# Patient Record
Sex: FEMALE | Race: WHITE | Employment: FULL TIME | URBAN - METROPOLITAN AREA
[De-identification: names, ages, dates, MRNs, and addresses within clinical notes are randomized per-mention and may not be internally consistent; named-entity substitution may affect disease eponyms.]

---

## 2018-05-31 ENCOUNTER — APPOINTMENT (OUTPATIENT)
Dept: GENERAL RADIOLOGY | Facility: CLINIC | Age: 48
End: 2018-05-31
Attending: EMERGENCY MEDICINE
Payer: COMMERCIAL

## 2018-05-31 ENCOUNTER — HOSPITAL ENCOUNTER (EMERGENCY)
Facility: CLINIC | Age: 48
Discharge: HOME | End: 2018-06-01
Attending: EMERGENCY MEDICINE | Admitting: EMERGENCY MEDICINE
Payer: COMMERCIAL

## 2018-05-31 DIAGNOSIS — N92.1 PROLONGED MENSTRUATION: ICD-10-CM

## 2018-05-31 DIAGNOSIS — E87.6 HYPOKALEMIA: ICD-10-CM

## 2018-05-31 DIAGNOSIS — R42 INTERMITTENT LIGHTHEADEDNESS: ICD-10-CM

## 2018-05-31 DIAGNOSIS — D50.9 MICROCYTIC ANEMIA: ICD-10-CM

## 2018-05-31 LAB
ALBUMIN SERPL-MCNC: 3.5 G/DL (ref 3.4–5)
ALBUMIN UR-MCNC: NEGATIVE MG/DL
ALP SERPL-CCNC: 102 U/L (ref 40–150)
ALT SERPL W P-5'-P-CCNC: 15 U/L (ref 0–50)
ANION GAP SERPL CALCULATED.3IONS-SCNC: 8 MMOL/L (ref 3–14)
APPEARANCE UR: CLEAR
AST SERPL W P-5'-P-CCNC: 11 U/L (ref 0–45)
BACTERIA #/AREA URNS HPF: ABNORMAL /HPF
BASOPHILS # BLD AUTO: 0 10E9/L (ref 0–0.2)
BASOPHILS NFR BLD AUTO: 0.4 %
BILIRUB DIRECT SERPL-MCNC: <0.1 MG/DL (ref 0–0.2)
BILIRUB SERPL-MCNC: 0.2 MG/DL (ref 0.2–1.3)
BILIRUB UR QL STRIP: NEGATIVE
BUN SERPL-MCNC: 7 MG/DL (ref 7–30)
CALCIUM SERPL-MCNC: 8.3 MG/DL (ref 8.5–10.1)
CHLORIDE SERPL-SCNC: 107 MMOL/L (ref 94–109)
CO2 SERPL-SCNC: 24 MMOL/L (ref 20–32)
COLOR UR AUTO: ABNORMAL
CREAT SERPL-MCNC: 0.76 MG/DL (ref 0.52–1.04)
D DIMER PPP FEU-MCNC: <0.3 UG/ML FEU (ref 0–0.5)
DIFFERENTIAL METHOD BLD: ABNORMAL
EOSINOPHIL # BLD AUTO: 0.1 10E9/L (ref 0–0.7)
EOSINOPHIL NFR BLD AUTO: 1.5 %
ERYTHROCYTE [DISTWIDTH] IN BLOOD BY AUTOMATED COUNT: 18.3 % (ref 10–15)
GFR SERPL CREATININE-BSD FRML MDRD: 80 ML/MIN/1.7M2
GLUCOSE SERPL-MCNC: 141 MG/DL (ref 70–99)
GLUCOSE UR STRIP-MCNC: NEGATIVE MG/DL
HCG SERPL QL: NEGATIVE
HCT VFR BLD AUTO: 29.2 % (ref 35–47)
HGB BLD-MCNC: 8.5 G/DL (ref 11.7–15.7)
HGB UR QL STRIP: NEGATIVE
IMM GRANULOCYTES # BLD: 0 10E9/L (ref 0–0.4)
IMM GRANULOCYTES NFR BLD: 0.1 %
INTERPRETATION ECG - MUSE: NORMAL
KETONES UR STRIP-MCNC: NEGATIVE MG/DL
LACTATE BLD-SCNC: 1.8 MMOL/L (ref 0.7–2)
LEUKOCYTE ESTERASE UR QL STRIP: NEGATIVE
LIPASE SERPL-CCNC: 180 U/L (ref 73–393)
LYMPHOCYTES # BLD AUTO: 2 10E9/L (ref 0.8–5.3)
LYMPHOCYTES NFR BLD AUTO: 25.7 %
MCH RBC QN AUTO: 20 PG (ref 26.5–33)
MCHC RBC AUTO-ENTMCNC: 29.1 G/DL (ref 31.5–36.5)
MCV RBC AUTO: 69 FL (ref 78–100)
MONOCYTES # BLD AUTO: 0.4 10E9/L (ref 0–1.3)
MONOCYTES NFR BLD AUTO: 5.4 %
NEUTROPHILS # BLD AUTO: 5.3 10E9/L (ref 1.6–8.3)
NEUTROPHILS NFR BLD AUTO: 66.9 %
NITRATE UR QL: NEGATIVE
NRBC # BLD AUTO: 0 10*3/UL
NRBC BLD AUTO-RTO: 0 /100
NT-PROBNP SERPL-MCNC: 171 PG/ML (ref 0–450)
PH UR STRIP: 6.5 PH (ref 5–7)
PLATELET # BLD AUTO: 413 10E9/L (ref 150–450)
POTASSIUM SERPL-SCNC: 3.3 MMOL/L (ref 3.4–5.3)
PROT SERPL-MCNC: 7 G/DL (ref 6.8–8.8)
RBC # BLD AUTO: 4.24 10E12/L (ref 3.8–5.2)
RBC #/AREA URNS AUTO: <1 /HPF (ref 0–2)
SODIUM SERPL-SCNC: 139 MMOL/L (ref 133–144)
SOURCE: ABNORMAL
SP GR UR STRIP: 1 (ref 1–1.03)
TROPONIN I SERPL-MCNC: <0.015 UG/L (ref 0–0.04)
TSH SERPL DL<=0.005 MIU/L-ACNC: 4.17 MU/L (ref 0.4–4)
UROBILINOGEN UR STRIP-MCNC: NORMAL MG/DL (ref 0–2)
WBC # BLD AUTO: 7.9 10E9/L (ref 4–11)
WBC #/AREA URNS AUTO: <1 /HPF (ref 0–5)

## 2018-05-31 PROCEDURE — 80076 HEPATIC FUNCTION PANEL: CPT | Performed by: EMERGENCY MEDICINE

## 2018-05-31 PROCEDURE — 85025 COMPLETE CBC W/AUTO DIFF WBC: CPT | Performed by: EMERGENCY MEDICINE

## 2018-05-31 PROCEDURE — 71046 X-RAY EXAM CHEST 2 VIEWS: CPT

## 2018-05-31 PROCEDURE — 83690 ASSAY OF LIPASE: CPT | Performed by: EMERGENCY MEDICINE

## 2018-05-31 PROCEDURE — 25000128 H RX IP 250 OP 636: Performed by: EMERGENCY MEDICINE

## 2018-05-31 PROCEDURE — 96361 HYDRATE IV INFUSION ADD-ON: CPT

## 2018-05-31 PROCEDURE — 85379 FIBRIN DEGRADATION QUANT: CPT | Performed by: EMERGENCY MEDICINE

## 2018-05-31 PROCEDURE — 84443 ASSAY THYROID STIM HORMONE: CPT | Performed by: EMERGENCY MEDICINE

## 2018-05-31 PROCEDURE — 83605 ASSAY OF LACTIC ACID: CPT | Performed by: EMERGENCY MEDICINE

## 2018-05-31 PROCEDURE — 84703 CHORIONIC GONADOTROPIN ASSAY: CPT | Performed by: EMERGENCY MEDICINE

## 2018-05-31 PROCEDURE — 25000132 ZZH RX MED GY IP 250 OP 250 PS 637: Performed by: EMERGENCY MEDICINE

## 2018-05-31 PROCEDURE — 84484 ASSAY OF TROPONIN QUANT: CPT | Performed by: EMERGENCY MEDICINE

## 2018-05-31 PROCEDURE — 81001 URINALYSIS AUTO W/SCOPE: CPT | Performed by: EMERGENCY MEDICINE

## 2018-05-31 PROCEDURE — 96360 HYDRATION IV INFUSION INIT: CPT

## 2018-05-31 PROCEDURE — 99285 EMERGENCY DEPT VISIT HI MDM: CPT | Mod: 25

## 2018-05-31 PROCEDURE — 83880 ASSAY OF NATRIURETIC PEPTIDE: CPT | Performed by: EMERGENCY MEDICINE

## 2018-05-31 PROCEDURE — 93005 ELECTROCARDIOGRAM TRACING: CPT

## 2018-05-31 PROCEDURE — 80048 BASIC METABOLIC PNL TOTAL CA: CPT | Performed by: EMERGENCY MEDICINE

## 2018-05-31 RX ORDER — ACETAMINOPHEN 325 MG/1
650 TABLET ORAL ONCE
Status: COMPLETED | OUTPATIENT
Start: 2018-05-31 | End: 2018-05-31

## 2018-05-31 RX ORDER — POTASSIUM CHLORIDE 1500 MG/1
20 TABLET, EXTENDED RELEASE ORAL ONCE
Status: COMPLETED | OUTPATIENT
Start: 2018-05-31 | End: 2018-05-31

## 2018-05-31 RX ADMIN — POTASSIUM CHLORIDE 20 MEQ: 1500 TABLET, EXTENDED RELEASE ORAL at 23:39

## 2018-05-31 RX ADMIN — ACETAMINOPHEN 650 MG: 325 TABLET ORAL at 22:32

## 2018-05-31 RX ADMIN — SODIUM CHLORIDE 1000 ML: 9 INJECTION, SOLUTION INTRAVENOUS at 23:40

## 2018-05-31 RX ADMIN — SODIUM CHLORIDE 1000 ML: 9 INJECTION, SOLUTION INTRAVENOUS at 22:32

## 2018-05-31 ASSESSMENT — ENCOUNTER SYMPTOMS
SHORTNESS OF BREATH: 1
DIARRHEA: 0
LIGHT-HEADEDNESS: 1
FREQUENCY: 0
FATIGUE: 1
NUMBNESS: 1
FEVER: 1
HEADACHES: 1
COUGH: 1
WEAKNESS: 1
DYSURIA: 0
ABDOMINAL PAIN: 0
APPETITE CHANGE: 1
NECK STIFFNESS: 0

## 2018-05-31 NOTE — ED AVS SNAPSHOT
Emergency Department    [address] [place] [address]    Phone:  [phone]    Fax:  [phone]                                       Ericka Lantigua   MRN: [id number]    Department:   Emergency Department   Date of Visit:  5/31/2018           After Visit Summary Signature Page     I have received my discharge instructions, and my questions have been answered. I have discussed any challenges I see with this plan with the nurse or doctor.    ..........................................................................................................................................  Patient/Patient Representative Signature      ..........................................................................................................................................  Patient Representative Print Name and Relationship to Patient    ..................................................               ................................................  Date                                            Time    ..........................................................................................................................................  Reviewed by Signature/Title    ...................................................              ..............................................  Date                                                            Time

## 2018-05-31 NOTE — ED AVS SNAPSHOT
Emergency Department    [address] [facility] [address]    Phone:  [phone]    Fax:  [phone]                                       Ericka Lantigua   MRN: [id number]    Department:   Emergency Department   Date of Visit:  5/31/2018           Patient Information     Date Of Birth          1970        Your diagnoses for this visit were:     Microcytic anemia     Intermittent lightheadedness     Prolonged menstruation     Hypokalemia        You were seen by Nhung Moseley MD.      Follow-up Information     Follow up with Herminio Higgins MD In 2 days.    Specialty:  Family Practice    Why:  Primary care    Contact information:    [employer]   [address]  [facility] [place] [facility] [address] [phone]          Follow up with [facility] In 2 days.    Why:  OB/Gyn    Contact information:    [facility], [address]  [address]  [facility] [address]          Follow up with  Emergency Department.    Specialty:  EMERGENCY MEDICINE    Why:  If symptoms worsen    Contact information:    [address] [facility] [address] [phone]        Discharge Instructions       Establish care with a gynecologist and primary care provider to discuss your symptoms.  You may benefit from a pelvic ultrasound and iron studies among other outpatient test depending on your symptoms.    In the meantime, start taking iron and increase dietary iron and potassium intake.    Return to the emergency department with new or concerning symptoms.    Discharge References/Attachments     ANEMIA, IRON-DEFICIENCY (ADULT) (ENGLISH)    DIZZINESS, UNCERTAIN CAUSE (ENGLISH)    HYPOKALEMIA (ENGLISH)    DYSFUNCTIONAL UTERINE BLEEDING (ENGLISH)      24 Hour Appointment Hotline       To make an appointment at any [facility], call [phone] ([phone]). If you don't have a family doctor or clinic, we will help you find one. [facility] are conveniently located  to serve the needs of you and your family.             Review of your medicines      START taking        Dose / Directions Last dose taken    ferrous sulfate 325 (65 Fe) MG tablet   Commonly known as:  IRON   Dose:  325 mg   Quantity:  30 tablet        Take 1 tablet (325 mg) by mouth daily (with breakfast)   Refills:  0                Prescriptions were sent or printed at these locations (1 Prescription)                   Other Prescriptions                Printed at Department/Unit printer (1 of 1)         ferrous sulfate (IRON) 325 (65 Fe) MG tablet                Procedures and tests performed during your visit     Basic metabolic panel    CBC with platelets differential    Cardiac Continuous Monitoring    D dimer quantitative    EKG 12-lead, tracing only    HCG qualitative Blood    Hepatic panel    Lactic acid whole blood    Lipase    Nt probnp inpatient (BNP)    Orthostatic blood pressure and pulse    Peripheral IV catheter    Pulse oximetry nursing    TSH    Troponin I    UA with Microscopic    XR Chest 2 Views      Orders Needing Specimen Collection     None      Pending Results     No orders found for last 3 day(s).            Pending Culture Results     No orders found for last 3 day(s).            Pending Results Instructions     If you had any lab results that were not finalized at the time of your Discharge, you can call the ED Lab Result RN at [phone]. You will be contacted by this team for any positive Lab results or changes in treatment. The nurses are available 7 days a week from 10A to 6:30P.  You can leave a message 24 hours per day and they will return your call.        Test Results From Your Hospital Stay        5/31/2018 10:33 PM      Narrative     CHEST TWO VIEWS  5/31/2018 10:26 PM     HISTORY: Dyspnea, intermittent, tachycardia.    COMPARISON: None.        Impression     IMPRESSION: Normal.    JOSE PALMER MD         5/31/2018 10:46 PM      Component Results     Component Value Ref  Range & Units Status    Color Urine Straw  Final    Appearance Urine Clear  Final    Glucose Urine Negative NEG^Negative mg/dL Final    Bilirubin Urine Negative NEG^Negative Final    Ketones Urine Negative NEG^Negative mg/dL Final    Specific Gravity Urine 1.002 (L) 1.003 - 1.035 Final    Blood Urine Negative NEG^Negative Final    pH Urine 6.5 5.0 - 7.0 pH Final    Protein Albumin Urine Negative NEG^Negative mg/dL Final    Urobilinogen mg/dL Normal 0.0 - 2.0 mg/dL Final    Nitrite Urine Negative NEG^Negative Final    Leukocyte Esterase Urine Negative NEG^Negative Final    Source Midstream Urine  Final    WBC Urine <1 0 - 5 /HPF Final    RBC Urine <1 0 - 2 /HPF Final    Bacteria Urine Few (A) NEG^Negative /HPF Final         5/31/2018 10:45 PM      Component Results     Component Value Ref Range & Units Status    HCG Qualitative Serum Negative NEG^Negative Final    This test is for screening purposes.  Results should be interpreted along with   the clinical picture.  Confirmation testing is available if warranted by   ordering [id number], HCG Quantitative Pregnancy.           5/31/2018 10:51 PM      Component Results     Component Value Ref Range & Units Status    N-Terminal Pro BNP Inpatient 171 0 - 450 pg/mL Final       Reference range shown and results flagged as abnormal are suggested inpatient   cut points for confirming diagnosis if CHF in an acute setting. Establishing a   baseline value for each individual patient is useful for follow-up. An   inpatient or emergency department NT-proPBNP <300 pg/mL effectively rules out   acute CHF, with 99% negative predictive value.  The outpatient non-acute reference range for ruling out CHF is:   0-125 pg/mL (age 18 to less than 75)   0-450 pg/mL (age 75 yrs and older)           5/31/2018 10:51 PM      Component Results     Component Value Ref Range & Units Status    TSH 4.17 (H) 0.40 - 4.00 mU/L Final         5/31/2018 10:45 PM      Component Results     Component Value Ref  Range & Units Status    Troponin I ES <0.015 0.000 - 0.045 ug/L Final    The 99th percentile for upper reference range is 0.045 ug/L.  Troponin values   in the range of 0.045 - 0.120 ug/L may be associated with risks of adverse   clinical events.           5/31/2018 10:48 PM      Component Results     Component Value Ref Range & Units Status    Lactic Acid 1.8 0.7 - 2.0 mmol/L Final         5/31/2018 10:40 PM      Component Results     Component Value Ref Range & Units Status    Sodium 139 133 - 144 mmol/L Final    Potassium 3.3 (L) 3.4 - 5.3 mmol/L Final    Chloride 107 94 - 109 mmol/L Final    Carbon Dioxide 24 20 - 32 mmol/L Final    Anion Gap 8 3 - 14 mmol/L Final    Glucose 141 (H) 70 - 99 mg/dL Final    Urea Nitrogen 7 7 - 30 mg/dL Final    Creatinine 0.76 0.52 - 1.04 mg/dL Final    GFR Estimate 80 >60 mL/min/1.7m2 Final    Non  GFR Calc    GFR Estimate If Black >90 >60 mL/min/1.7m2 Final    African American GFR Calc    Calcium 8.3 (L) 8.5 - 10.1 mg/dL Final         5/31/2018 10:40 PM      Component Results     Component Value Ref Range & Units Status    Lipase 180 73 - 393 U/L Final         5/31/2018 10:45 PM      Component Results     Component Value Ref Range & Units Status    Bilirubin Direct <0.1 0.0 - 0.2 mg/dL Final    Bilirubin Total 0.2 0.2 - 1.3 mg/dL Final    Albumin 3.5 3.4 - 5.0 g/dL Final    Protein Total 7.0 6.8 - 8.8 g/dL Final    Alkaline Phosphatase 102 40 - 150 U/L Final    ALT 15 0 - 50 U/L Final    AST 11 0 - 45 U/L Final         5/31/2018 10:25 PM      Component Results     Component Value Ref Range & Units Status    WBC 7.9 4.0 - 11.0 10e9/L Final    RBC Count 4.24 3.8 - 5.2 10e12/L Final    Hemoglobin 8.5 (L) 11.7 - 15.7 g/dL Final    Hematocrit 29.2 (L) 35.0 - 47.0 % Final    MCV 69 (L) 78 - 100 fl Final    MCH 20.0 (L) 26.5 - 33.0 pg Final    MCHC 29.1 (L) 31.5 - 36.5 g/dL Final    RDW 18.3 (H) 10.0 - 15.0 % Final    Platelet Count 413 150 - 450 10e9/L Final    Diff  Method Automated Method  Final    % Neutrophils 66.9 % Final    % Lymphocytes 25.7 % Final    % Monocytes 5.4 % Final    % Eosinophils 1.5 % Final    % Basophils 0.4 % Final    % Immature Granulocytes 0.1 % Final    Nucleated RBCs 0 0 /100 Final    Absolute Neutrophil 5.3 1.6 - 8.3 10e9/L Final    Absolute Lymphocytes 2.0 0.8 - 5.3 10e9/L Final    Absolute Monocytes 0.4 0.0 - 1.3 10e9/L Final    Absolute Eosinophils 0.1 0.0 - 0.7 10e9/L Final    Absolute Basophils 0.0 0.0 - 0.2 10e9/L Final    Abs Immature Granulocytes 0.0 0 - 0.4 10e9/L Final    Absolute Nucleated RBC 0.0  Final         5/31/2018 11:21 PM      Component Results     Component Value Ref Range & Units Status    D Dimer <0.3 0.0 - 0.50 ug/ml FEU Final    This D-dimer assay is intended for use in conjunction with a clinical pretest   probability assessment model to exclude pulmonary embolism (PE) and deep   venous thrombosis (DVT) in outpatients suspected of PE or DVT. The cut-off   value is 0.5 ug/mL FEU.                  Clinical Quality Measure: Blood Pressure Screening     Your blood pressure was checked while you were in the emergency department today. The last reading we obtained was  BP: (!) 147/94 . Please read the guidelines below about what these numbers mean and what you should do about them.  If your systolic blood pressure (the top number) is less than 120 and your diastolic blood pressure (the bottom number) is less than 80, then your blood pressure is normal. There is nothing more that you need to do about it.  If your systolic blood pressure (the top number) is 120-139 or your diastolic blood pressure (the bottom number) is 80-89, your blood pressure may be higher than it should be. You should have your blood pressure rechecked within a year by a primary care provider.  If your systolic blood pressure (the top number) is 140 or greater or your diastolic blood pressure (the bottom number) is 90 or greater, you may have high blood  "pressure. High blood pressure is treatable, but if left untreated over time it can put you at risk for heart attack, stroke, or kidney failure. You should have your blood pressure rechecked by a primary care provider within the next 4 weeks.  If your provider in the emergency department today gave you specific instructions to follow-up with your doctor or provider even sooner than that, you should follow that instruction and not wait for up to 4 weeks for your follow-up visit.        Thank you for choosing [facility]       Thank you for choosing [facility] for your care. Our goal is always to provide you with excellent care. Hearing back from our patients is one way we can continue to improve our services. Please take a few minutes to complete the written survey that you may receive in the mail after you visit with us. Thank you!        [employer] Information     [email or web address] lets you send messages to your doctor, view your test results, renew your prescriptions, schedule appointments and more. To sign up, go to [email or web address] . Click on \"Log in\" on the left side of the screen, which will take you to the Welcome page. Then click on \"Sign up Now\" on the right side of the page.     You will be asked to enter the access code listed below, as well as some personal information. Please follow the directions to create your username and password.     Your access code is: [email or web address]  Expires: 2018  1:07 AM     Your access code will  in 90 days. If you need help or a new code, please call your [facility] clinic or [phone].        Care EveryWhere ID     This is your Care EveryWhere ID. This could be used by other organizations to access your [facility] medical records  [id number]        Equal Access to Services     JERRI RODRIGUEZ : cassie Kelley, eve bonilla. So [facility] [phone].    ATENCIÓN: Si habla español, tiene a thurman " disposición servicios gratuitos de asistencia lingüística. Parvin al [phone].    We comply with applicable federal civil rights laws and [place] laws. We do not discriminate on the basis of race, color, national origin, age, disability, sex, sexual orientation, or gender identity.            After Visit Summary       This is your record. Keep this with you and show to your community pharmacist(s) and doctor(s) at your next visit.

## 2018-06-01 VITALS
TEMPERATURE: 98.2 F | SYSTOLIC BLOOD PRESSURE: 135 MMHG | DIASTOLIC BLOOD PRESSURE: 88 MMHG | RESPIRATION RATE: 16 BRPM | OXYGEN SATURATION: 98 %

## 2018-06-01 RX ORDER — FERROUS SULFATE 325(65) MG
325 TABLET ORAL
Qty: 30 TABLET | Refills: 0 | Status: SHIPPED | OUTPATIENT
Start: 2018-06-01 | End: 2019-02-28

## 2018-06-01 ASSESSMENT — ENCOUNTER SYMPTOMS
ABDOMINAL DISTENTION: 1
CONSTIPATION: 0
UNEXPECTED WEIGHT CHANGE: 1

## 2018-06-01 NOTE — ED PROVIDER NOTES
"  History     Chief Complaint:  Fatigue and Cough    The history is provided by the patient.      Ericka Lantigua is a 47 year old female smoker with no significant PMH who presents to the emergency department today for evaluation of fatigue and cough. The patient reports that for about two weeks she has been feeling \"off,\" very tired, weak, and lightheaded. She describes the lightheadedness as feeling like her vision goes in and out of focus and as if she could pass out. For the past week she also reports a cough, both productive at times and dry, and sinus congestion. Yesterday, the patient states she noticed her lightheadedness more than usual and a tingling sensation in her four fingers of her left hand which she describes as feeling like someone shoving needles under her nails. She notes that this has since subsided but her fingers are sore and her left arm still feels tired and weak. The patient states that due to these symptoms she became concerned and based on her WebMD search is concerned for possible CHF. She reports that of the symptoms listed, she has swollen ankles for the past two weeks, shortness of breath that has been slowly building and happens both at rest and while doing household activities, fatigue, decreased appetite, abdominal bloating, feeling warm and feverish, and headache. Here in the ED, she states that she does not currently feel short of breath and did not feel short of breath while walking in, she feels as if she has gained weight recently but does not weigh herself at home. She describes a mild headache to her left forehead. She does indicate that she has not had a bowel movement for a few days but that is not unusual for her. She denies dysuria, urinary urgency or frequency, diarrhea, abdominal pain, nausea, vomiting, neck pain or stiffness, vision changes, or chest pain. Of note, the patient reports on Saturday she finished almost a month straight of menstruating with some days " going through a tampon and pad every 2-3 hours and other lighter days.    Allergies:  No Known Drug Allergies    Medications:    Medications reviewed. No current medications.     Past Medical History:    Anxiety    Past Surgical History:    Surgical history reviewed. No pertinent surgical history.    Family History:    Family history reviewed. No pertinent family history.     Social History:  The patient was accompanied to the ED by her .  Smoking Status: Current Every Day Smoker  Packs/day: 1.00  Alcohol Use: Positive  Marital Status:      Review of Systems   Constitutional: Positive for appetite change, fatigue, fever and unexpected weight change (subjective).   HENT: Positive for congestion.    Eyes: Negative for visual disturbance.   Respiratory: Positive for cough and shortness of breath.    Cardiovascular: Positive for leg swelling (to ankles per patient's report). Negative for chest pain.   Gastrointestinal: Positive for abdominal distention (bloated). Negative for abdominal pain, constipation and diarrhea.   Genitourinary: Negative for dysuria, frequency and urgency.   Musculoskeletal: Negative for neck stiffness.   Neurological: Positive for weakness, light-headedness, numbness and headaches.   All other systems reviewed and are negative.      Physical Exam     Patient Vitals for the past 24 hrs:   BP Temp Heart Rate Resp SpO2   06/01/18 0007 (!) 147/94 - 86 16 98 %   06/01/18 0000 136/85 - 90 21 98 %   05/31/18 2300 (!) 161/98 - 99 24 98 %   05/31/18 2237 - - - - 98 %   05/31/18 2236 (!) 155/101 - - - -   05/31/18 2211 - - 126 - 100 %   05/31/18 2204 - - - - 99 %   05/31/18 2203 (!) 188/109 - - - (!) 85 %   05/31/18 2133 (!) 196/92 98.2  F (36.8  C) 125 18 99 %     Lying Orthostatic BP: 139/86 ; Lying Orthostatic Pulse: 86 bpm   Sitting Orthostatic BP: 147/94 ; Sitting Orthostatic Pulse: 86 bpm   Standing Orthostatic BP: 144/93 ; Standing Orthostatic Pulse: 88 bpm    Physical  Exam  General: Well-developed and well-nourished. Well appearing middle aged  woman. Cooperative.  Head:  Atraumatic.  Eyes:  Conjunctivae, lids, and sclerae are normal.  ENT:    Normal nose. Moist mucous membranes.  Neck:  Supple. Normal range of motion.  CV:  Tachycardic rate and regular rhythm. Normal heart sounds with no murmurs, rubs, or gallops detected.  Resp:  No respiratory distress. Clear to auscultation bilaterally without decreased breath sounds, wheezing, rales, or rhonchi.  GI:  Soft. Non-distended. Non-tender.    MS:  Normal ROM. No bilateral lower extremity edema including no pitting edema around ankles. No calf tenderness.  Skin:  Warm. Non-diaphoretic. No pallor.  Neuro: Awake. A&Ox3.     Strength 5/5 bilateral upper and lower extremities.    No pronator drift.    Sensation intact to light touch.    No facial droop.    No aphasia. No dysarthria.    PERRL.  Psych: Normal mood and affect. Normal speech.  Vitals reviewed.    Emergency Department Course   EKG  Time: 2136  Rate 121 bpm. MT interval 138. QRS duration 80. QT/QTc 316/448.   Sinus tachycardia  Otherwise normal ECG   No acute ST changes.  No prior ECG for comparison.    Imaging:  Radiology findings were communicated with the patient who voiced understanding of the findings.    XR Chest 2 Views  Normal.  Reading per radiology    Laboratory:  Laboratory findings were communicated with the patient who voiced understanding of the findings.    Lactic Acid (Resulted: 2248): 1.8  UA: Specific Gravity 1.002 (L), Bacteria Few (A)  HCG Qualitative Blood: Negative  BNP: 171  TSH: 4.17 (H)  CBC: WBC 7.9, HGB 8.5 (L),   BMP: Potassium 3.3 (L), Glucose 141 (H), Calcium 8.3 (L) o/w WNL (Creatinine 0.76)  Lipase: 180  Hepatic Panel: Normal  D-Dimer: <0.3    Interventions:  2232 NS 1000 ml IV  2232 Tylenol 650 mg PO  2339 Potassium Chloride 20 mEq PO  2340 NS 1000 ml IV    Emergency Department Course:    2203 Nursing notes and vitals  reviewed.    2205 I performed an exam of the patient as documented above.     2217 IV was inserted and blood was drawn for laboratory testing, results above.    2226 The patient was sent for a XR Chest 2 Views while in the emergency department, results above.     2359 The patient was rechecked and updated. The patient's blood pressure and heart rate are much better.    0031 The patient was rechecked and updated. Her headache went away and it is coming back but she thinks it is because she is tired. The patient otherwise feels, well but is tired.     0056 I personally reviewed the laboratory, imaging, and ECG results with the patient and answered all related questions prior to discharge.    Impression & Plan      Medical Decision Making:  Ericka is a 47 year old woman who presents with a myriad of complaints ranging from concern for ankle edema to prolonged menstrual bleeding and intermittent lightheadedness and dyspnea.  Patient is concerned she has CHF after she did an online search.  It is unclear which of her symptoms is most concerning to her and I am unable to narrow down exact chronicity of many of her symptoms.  Overall, she is very well-appearing though she is hypertensive to 180s-190s systolic and tachycardic to 120s.  EKG is reassuring aside from sinus tachycardia.  Her workup is largely unremarkable.  Her BNP is normal at 171 and chest x-ray is without acute pathology such as pulmonary edema.  Further, on her exam I appreciate no lower extremity edema which was one of her concerning symptoms.  Her symptoms and presentation are not consistent with a new CHF or any other cardiac etiology including no evidence of acute ACS with negative troponin.  Given patient's intermittent dyspnea, I also obtained a d-dimer which is fortunately negative making PE at the etiology of her symptoms highly unlikely.  TSH is very minimally elevated which is unlikely to be of any clinical significance.  Hypokalemia to 3.3 is  also noted which was repleted with 20 mEq of potassium chloride and is also unlikely to be of any clinical significance and likely due to dietary intake.  Most notably, patient's hemoglobin is low with microcytic anemia to 8.5.  Baseline is unclear though I suspect this has at least a degree of chronicity in addition to an acute nature given her report of prolonged menstrual bleeding.  With MCV low at 69 I do suspect this is likely iron deficiency anemia.  Patient was given 2 L of IV fluids with resolution of her tachycardia she also had improvement of her hypertension without intervention.  Patient was given Tylenol for headache which she states resolved and on final recheck state is starting to come back which she believes this is because it is so late at night and she is feeling very fatigued.  Overall, she is feeling better than arrival and is reassured by our workup here.  I had a long discussion with the patient and her  about the importance of establishing care with a primary care provider and also her gynecologist to discuss her ongoing symptoms.  Patient may benefit from pelvic ultrasound and serial hemoglobins as well as iron studies as likely a degree of her symptoms are due to her anemia.  However, because she has so many symptoms that are somewhat mild and vague the differential remains broad including viral syndrome, malignancy, or a rheumatologic disease. She verbalized understanding of this plan and agrees to start iron supplementation and increase dietary iron and potassium while awaiting follow-up with both primary care and gynecology.  I have also provided strict return precautions in the interim and she verbalizes understanding of these.  She is amenable to discharge.  All questions answered.    Diagnosis:    ICD-10-CM    1. Microcytic anemia D50.9    2. Fatigue, unspecified type R53.83    3. Intermittent lightheadedness R42    4. Prolonged menstruation N92.1      Disposition:   The  patient was discharged home.    Discharge Medications:  New Prescriptions    FERROUS SULFATE (IRON) 325 (65 FE) MG TABLET    Take 1 tablet (325 mg) by mouth daily (with breakfast)     Scribe Disclosure:  I, Vesta Marni, am serving as a scribe at 10:04 PM on 5/31/2018 to document services personally performed by Nhung Moseley MD based on my observations and the provider's statements to me.     EMERGENCY DEPARTMENT       Nhung Moseley MD  06/01/18 2528

## 2018-06-01 NOTE — DISCHARGE INSTRUCTIONS
Establish care with a gynecologist and primary care provider to discuss your symptoms.  You may benefit from a pelvic ultrasound and iron studies among other outpatient test depending on your symptoms.    In the meantime, start taking iron and increase dietary iron and potassium intake.    Return to the emergency department with new or concerning symptoms.

## 2019-02-28 ENCOUNTER — APPOINTMENT (OUTPATIENT)
Dept: MRI IMAGING | Facility: CLINIC | Age: 49
DRG: 069 | End: 2019-02-28
Attending: EMERGENCY MEDICINE
Payer: COMMERCIAL

## 2019-02-28 ENCOUNTER — HOSPITAL ENCOUNTER (INPATIENT)
Facility: CLINIC | Age: 49
LOS: 1 days | Discharge: HOME | DRG: 069 | End: 2019-03-01
Attending: EMERGENCY MEDICINE | Admitting: INTERNAL MEDICINE
Payer: COMMERCIAL

## 2019-02-28 DIAGNOSIS — I63.9 CEREBROVASCULAR ACCIDENT (CVA), UNSPECIFIED MECHANISM (H): ICD-10-CM

## 2019-02-28 DIAGNOSIS — F17.200 TOBACCO DEPENDENCE SYNDROME: Primary | ICD-10-CM

## 2019-02-28 DIAGNOSIS — E78.5 HYPERLIPIDEMIA LDL GOAL <100: ICD-10-CM

## 2019-02-28 DIAGNOSIS — H53.8 BLURRED VISION: ICD-10-CM

## 2019-02-28 LAB
ALBUMIN SERPL-MCNC: 3.3 G/DL (ref 3.4–5)
ALP SERPL-CCNC: 115 U/L (ref 40–150)
ALT SERPL W P-5'-P-CCNC: 16 U/L (ref 0–50)
ANION GAP SERPL CALCULATED.3IONS-SCNC: 2 MMOL/L (ref 3–14)
ANION GAP SERPL CALCULATED.3IONS-SCNC: 7 MMOL/L (ref 3–14)
ANISOCYTOSIS BLD QL SMEAR: ABNORMAL
AST SERPL W P-5'-P-CCNC: 13 U/L (ref 0–45)
BASOPHILS # BLD AUTO: 0.1 10E9/L (ref 0–0.2)
BASOPHILS NFR BLD AUTO: 0.7 %
BILIRUB DIRECT SERPL-MCNC: <0.1 MG/DL (ref 0–0.2)
BILIRUB SERPL-MCNC: 0.1 MG/DL (ref 0.2–1.3)
BUN SERPL-MCNC: 9 MG/DL (ref 7–30)
BUN SERPL-MCNC: 9 MG/DL (ref 7–30)
CALCIUM SERPL-MCNC: 8.3 MG/DL (ref 8.5–10.1)
CALCIUM SERPL-MCNC: 8.6 MG/DL (ref 8.5–10.1)
CHLORIDE SERPL-SCNC: 104 MMOL/L (ref 94–109)
CHLORIDE SERPL-SCNC: 112 MMOL/L (ref 94–109)
CHOLEST SERPL-MCNC: 187 MG/DL
CO2 SERPL-SCNC: 25 MMOL/L (ref 20–32)
CO2 SERPL-SCNC: 26 MMOL/L (ref 20–32)
CREAT SERPL-MCNC: 0.68 MG/DL (ref 0.52–1.04)
CREAT SERPL-MCNC: 0.71 MG/DL (ref 0.52–1.04)
DIFFERENTIAL METHOD BLD: ABNORMAL
ELLIPTOCYTES BLD QL SMEAR: SLIGHT
EOSINOPHIL # BLD AUTO: 0.1 10E9/L (ref 0–0.7)
EOSINOPHIL NFR BLD AUTO: 1.1 %
ERYTHROCYTE [DISTWIDTH] IN BLOOD BY AUTOMATED COUNT: 19.7 % (ref 10–15)
ERYTHROCYTE [DISTWIDTH] IN BLOOD BY AUTOMATED COUNT: 19.7 % (ref 10–15)
GFR SERPL CREATININE-BSD FRML MDRD: >90 ML/MIN/{1.73_M2}
GFR SERPL CREATININE-BSD FRML MDRD: >90 ML/MIN/{1.73_M2}
GLUCOSE SERPL-MCNC: 109 MG/DL (ref 70–99)
GLUCOSE SERPL-MCNC: 93 MG/DL (ref 70–99)
HBA1C MFR BLD: 5.1 % (ref 0–5.6)
HCT VFR BLD AUTO: 27.1 % (ref 35–47)
HCT VFR BLD AUTO: 31 % (ref 35–47)
HDLC SERPL-MCNC: 42 MG/DL
HGB BLD-MCNC: 7.4 G/DL (ref 11.7–15.7)
HGB BLD-MCNC: 8.3 G/DL (ref 11.7–15.7)
IMM GRANULOCYTES # BLD: 0 10E9/L (ref 0–0.4)
IMM GRANULOCYTES NFR BLD: 0.1 %
INR PPP: 1 (ref 0.86–1.14)
LDLC SERPL CALC-MCNC: 115 MG/DL
LYMPHOCYTES # BLD AUTO: 1.5 10E9/L (ref 0.8–5.3)
LYMPHOCYTES NFR BLD AUTO: 20.5 %
MCH RBC QN AUTO: 17.1 PG (ref 26.5–33)
MCH RBC QN AUTO: 17.4 PG (ref 26.5–33)
MCHC RBC AUTO-ENTMCNC: 26.8 G/DL (ref 31.5–36.5)
MCHC RBC AUTO-ENTMCNC: 27.3 G/DL (ref 31.5–36.5)
MCV RBC AUTO: 64 FL (ref 78–100)
MCV RBC AUTO: 64 FL (ref 78–100)
MICROCYTES BLD QL SMEAR: PRESENT
MONOCYTES # BLD AUTO: 0.5 10E9/L (ref 0–1.3)
MONOCYTES NFR BLD AUTO: 6.9 %
NEUTROPHILS # BLD AUTO: 5.2 10E9/L (ref 1.6–8.3)
NEUTROPHILS NFR BLD AUTO: 70.7 %
NONHDLC SERPL-MCNC: 145 MG/DL
NRBC # BLD AUTO: 0 10*3/UL
NRBC BLD AUTO-RTO: 0 /100
PLATELET # BLD AUTO: 326 10E9/L (ref 150–450)
PLATELET # BLD AUTO: 404 10E9/L (ref 150–450)
PLATELET # BLD EST: ABNORMAL 10*3/UL
POTASSIUM SERPL-SCNC: 3.9 MMOL/L (ref 3.4–5.3)
POTASSIUM SERPL-SCNC: 3.9 MMOL/L (ref 3.4–5.3)
PROT SERPL-MCNC: 6.8 G/DL (ref 6.8–8.8)
RBC # BLD AUTO: 4.25 10E12/L (ref 3.8–5.2)
RBC # BLD AUTO: 4.85 10E12/L (ref 3.8–5.2)
RBC INCLUSIONS BLD: SLIGHT
SODIUM SERPL-SCNC: 136 MMOL/L (ref 133–144)
SODIUM SERPL-SCNC: 140 MMOL/L (ref 133–144)
TARGETS BLD QL SMEAR: ABNORMAL
TRIGL SERPL-MCNC: 152 MG/DL
TROPONIN I SERPL-MCNC: <0.015 UG/L (ref 0–0.04)
TSH SERPL DL<=0.005 MIU/L-ACNC: 3.05 MU/L (ref 0.4–4)
WBC # BLD AUTO: 10.2 10E9/L (ref 4–11)
WBC # BLD AUTO: 7.4 10E9/L (ref 4–11)

## 2019-02-28 PROCEDURE — 25800030 ZZH RX IP 258 OP 636: Performed by: INTERNAL MEDICINE

## 2019-02-28 PROCEDURE — 99223 1ST HOSP IP/OBS HIGH 75: CPT | Mod: AI | Performed by: INTERNAL MEDICINE

## 2019-02-28 PROCEDURE — 40000895 ZZH STATISTIC SLP IP EVAL DEFER: Performed by: SPEECH-LANGUAGE PATHOLOGIST

## 2019-02-28 PROCEDURE — 83036 HEMOGLOBIN GLYCOSYLATED A1C: CPT | Performed by: INTERNAL MEDICINE

## 2019-02-28 PROCEDURE — 80048 BASIC METABOLIC PNL TOTAL CA: CPT | Performed by: INTERNAL MEDICINE

## 2019-02-28 PROCEDURE — 82306 VITAMIN D 25 HYDROXY: CPT | Performed by: INTERNAL MEDICINE

## 2019-02-28 PROCEDURE — 25000132 ZZH RX MED GY IP 250 OP 250 PS 637: Performed by: EMERGENCY MEDICINE

## 2019-02-28 PROCEDURE — 36415 COLL VENOUS BLD VENIPUNCTURE: CPT | Performed by: INTERNAL MEDICINE

## 2019-02-28 PROCEDURE — 25500064 ZZH RX 255 OP 636: Performed by: EMERGENCY MEDICINE

## 2019-02-28 PROCEDURE — 70544 MR ANGIOGRAPHY HEAD W/O DYE: CPT

## 2019-02-28 PROCEDURE — 84484 ASSAY OF TROPONIN QUANT: CPT | Performed by: INTERNAL MEDICINE

## 2019-02-28 PROCEDURE — 85025 COMPLETE CBC W/AUTO DIFF WBC: CPT | Performed by: EMERGENCY MEDICINE

## 2019-02-28 PROCEDURE — 93005 ELECTROCARDIOGRAM TRACING: CPT

## 2019-02-28 PROCEDURE — 84443 ASSAY THYROID STIM HORMONE: CPT | Performed by: INTERNAL MEDICINE

## 2019-02-28 PROCEDURE — 70553 MRI BRAIN STEM W/O & W/DYE: CPT

## 2019-02-28 PROCEDURE — 80061 LIPID PANEL: CPT | Performed by: INTERNAL MEDICINE

## 2019-02-28 PROCEDURE — 25000128 H RX IP 250 OP 636: Performed by: EMERGENCY MEDICINE

## 2019-02-28 PROCEDURE — 85610 PROTHROMBIN TIME: CPT | Performed by: EMERGENCY MEDICINE

## 2019-02-28 PROCEDURE — 96374 THER/PROPH/DIAG INJ IV PUSH: CPT | Mod: 59

## 2019-02-28 PROCEDURE — 80048 BASIC METABOLIC PNL TOTAL CA: CPT | Performed by: EMERGENCY MEDICINE

## 2019-02-28 PROCEDURE — 70549 MR ANGIOGRAPH NECK W/O&W/DYE: CPT

## 2019-02-28 PROCEDURE — 93010 ELECTROCARDIOGRAM REPORT: CPT | Performed by: INTERNAL MEDICINE

## 2019-02-28 PROCEDURE — 80076 HEPATIC FUNCTION PANEL: CPT | Performed by: INTERNAL MEDICINE

## 2019-02-28 PROCEDURE — 99285 EMERGENCY DEPT VISIT HI MDM: CPT | Mod: 25

## 2019-02-28 PROCEDURE — 25000132 ZZH RX MED GY IP 250 OP 250 PS 637: Performed by: INTERNAL MEDICINE

## 2019-02-28 PROCEDURE — 85027 COMPLETE CBC AUTOMATED: CPT | Performed by: INTERNAL MEDICINE

## 2019-02-28 PROCEDURE — 12000000 ZZH R&B MED SURG/OB

## 2019-02-28 PROCEDURE — 25800025 ZZH RX 258: Performed by: EMERGENCY MEDICINE

## 2019-02-28 PROCEDURE — A9585 GADOBUTROL INJECTION: HCPCS | Performed by: EMERGENCY MEDICINE

## 2019-02-28 RX ORDER — LABETALOL 20 MG/4 ML (5 MG/ML) INTRAVENOUS SYRINGE
10-40 EVERY 10 MIN PRN
Status: DISCONTINUED | OUTPATIENT
Start: 2019-02-28 | End: 2019-03-01 | Stop reason: HOSPADM

## 2019-02-28 RX ORDER — ONDANSETRON 4 MG/1
4 TABLET, ORALLY DISINTEGRATING ORAL EVERY 6 HOURS PRN
Status: DISCONTINUED | OUTPATIENT
Start: 2019-02-28 | End: 2019-03-01 | Stop reason: HOSPADM

## 2019-02-28 RX ORDER — ONDANSETRON 2 MG/ML
4 INJECTION INTRAMUSCULAR; INTRAVENOUS EVERY 6 HOURS PRN
Status: DISCONTINUED | OUTPATIENT
Start: 2019-02-28 | End: 2019-03-01 | Stop reason: HOSPADM

## 2019-02-28 RX ORDER — LORAZEPAM 2 MG/ML
1 INJECTION INTRAMUSCULAR ONCE
Status: COMPLETED | OUTPATIENT
Start: 2019-02-28 | End: 2019-02-28

## 2019-02-28 RX ORDER — ASPIRIN 325 MG
325 TABLET ORAL ONCE
Status: COMPLETED | OUTPATIENT
Start: 2019-02-28 | End: 2019-02-28

## 2019-02-28 RX ORDER — GADOBUTROL 604.72 MG/ML
10 INJECTION INTRAVENOUS ONCE
Status: COMPLETED | OUTPATIENT
Start: 2019-02-28 | End: 2019-02-28

## 2019-02-28 RX ORDER — LORAZEPAM 0.5 MG/1
1 TABLET ORAL ONCE
Status: DISCONTINUED | OUTPATIENT
Start: 2019-02-28 | End: 2019-02-28

## 2019-02-28 RX ORDER — ENALAPRILAT 1.25 MG/ML
0.62 INJECTION INTRAVENOUS EVERY 6 HOURS PRN
Status: DISCONTINUED | OUTPATIENT
Start: 2019-02-28 | End: 2019-03-01 | Stop reason: HOSPADM

## 2019-02-28 RX ORDER — ROSUVASTATIN CALCIUM 20 MG/1
20 TABLET, COATED ORAL AT BEDTIME
Status: DISCONTINUED | OUTPATIENT
Start: 2019-02-28 | End: 2019-03-01 | Stop reason: HOSPADM

## 2019-02-28 RX ORDER — ACYCLOVIR 200 MG/1
30 CAPSULE ORAL ONCE
Status: COMPLETED | OUTPATIENT
Start: 2019-02-28 | End: 2019-02-28

## 2019-02-28 RX ORDER — NALOXONE HYDROCHLORIDE 0.4 MG/ML
.1-.4 INJECTION, SOLUTION INTRAMUSCULAR; INTRAVENOUS; SUBCUTANEOUS
Status: DISCONTINUED | OUTPATIENT
Start: 2019-02-28 | End: 2019-03-01 | Stop reason: HOSPADM

## 2019-02-28 RX ORDER — ACETAMINOPHEN 325 MG/1
650 TABLET ORAL EVERY 4 HOURS PRN
Status: DISCONTINUED | OUTPATIENT
Start: 2019-02-28 | End: 2019-03-01 | Stop reason: HOSPADM

## 2019-02-28 RX ORDER — SODIUM CHLORIDE 9 MG/ML
INJECTION, SOLUTION INTRAVENOUS CONTINUOUS
Status: DISCONTINUED | OUTPATIENT
Start: 2019-02-28 | End: 2019-03-01 | Stop reason: HOSPADM

## 2019-02-28 RX ORDER — ASPIRIN 300 MG/1
300 SUPPOSITORY RECTAL DAILY
Status: DISCONTINUED | OUTPATIENT
Start: 2019-03-01 | End: 2019-03-01 | Stop reason: HOSPADM

## 2019-02-28 RX ORDER — NICOTINE 21 MG/24HR
1 PATCH, TRANSDERMAL 24 HOURS TRANSDERMAL DAILY
Status: DISCONTINUED | OUTPATIENT
Start: 2019-02-28 | End: 2019-03-01 | Stop reason: HOSPADM

## 2019-02-28 RX ADMIN — ASPIRIN 325 MG ORAL TABLET 325 MG: 325 PILL ORAL at 12:36

## 2019-02-28 RX ADMIN — SODIUM CHLORIDE 30 ML: 9 INJECTION, SOLUTION INTRAMUSCULAR; INTRAVENOUS; SUBCUTANEOUS at 10:56

## 2019-02-28 RX ADMIN — NICOTINE 1 PATCH: 14 PATCH, EXTENDED RELEASE TRANSDERMAL at 18:22

## 2019-02-28 RX ADMIN — LORAZEPAM 1 MG: 2 INJECTION INTRAMUSCULAR; INTRAVENOUS at 09:52

## 2019-02-28 RX ADMIN — SODIUM CHLORIDE: 9 INJECTION, SOLUTION INTRAVENOUS at 15:45

## 2019-02-28 RX ADMIN — GADOBUTROL 10 ML: 604.72 INJECTION INTRAVENOUS at 10:56

## 2019-02-28 ASSESSMENT — ACTIVITIES OF DAILY LIVING (ADL)
RETIRED_COMMUNICATION: 0-->UNDERSTANDS/COMMUNICATES WITHOUT DIFFICULTY
RETIRED_EATING: 0-->INDEPENDENT
TOILETING: 0-->INDEPENDENT
BATHING: 0-->INDEPENDENT
DRESS: 0-->INDEPENDENT
ADLS_ACUITY_SCORE: 13
ADLS_ACUITY_SCORE: 13
SWALLOWING: 0-->SWALLOWS FOODS/LIQUIDS WITHOUT DIFFICULTY

## 2019-02-28 ASSESSMENT — MIFFLIN-ST. JEOR: SCORE: 1457.76

## 2019-02-28 ASSESSMENT — ENCOUNTER SYMPTOMS
HEADACHES: 0
SPEECH DIFFICULTY: 0
WEAKNESS: 1

## 2019-02-28 NOTE — PHARMACY-ADMISSION MEDICATION HISTORY
Admission medication history interview status for the 2/28/2019  admission is complete. See EPIC admission navigator for prior to admission medications     Medication history source reliability:Good    Actions taken by pharmacist (provider contacted, etc):None     Additional medication history information not noted on PTA med list :None    Medication reconciliation/reorder completed by provider prior to medication history? No    Time spent in this activity: 5min    Prior to Admission medications    Not on File

## 2019-02-28 NOTE — PLAN OF CARE
Discharge Planner SLP   Patient plan for discharge: Did not meet with pt  Current status: SLP: ST orders received. Chart reviewed and discussed with RN. No ST needs identified at this time. Will sign off.   Barriers to return to prior living situation: None from SLP  Recommendations for discharge: Per Care Team  Rationale for recommendations: Will complete orders       Entered by: Lin Damian 02/28/2019 3:48 PM

## 2019-02-28 NOTE — ED NOTES
"[facility]  ED Nurse Handoff Report    ED Chief complaint: Eye Problem (woke with blurry vision has had same episode before on and off for 6 months with some left arm numbness)      ED Diagnosis:   Final diagnoses:   Cerebrovascular accident (CVA), unspecified mechanism (H)   Blurred vision       Code Status: to be addressed by admitting MD    Allergies: No Known Allergies    Activity level - Baseline/Home:  Independent    Activity Level - Current:   Independent     Needed?: No    Isolation: No  Infection: Not Applicable  Bariatric?: No    Vital Signs:   Vitals:    02/28/19 0905 02/28/19 0916   BP: 192/84    Pulse: 108    Resp: 16    Temp: 98.9  F (37.2  C)    TempSrc: Temporal    SpO2: 100%    Weight:  78.9 kg (174 lb)   Height: 1.727 m (5' 8\")        Cardiac Rhythm: ,        Pain level: 0-10 Pain Scale: 0    Is this patient confused?: No   Does this patient have a guardian?  No         If yes, is there guardianship documents in the Epic \"Code/ACP\" activity?  N/A         Guardian Notified?  N/A  [place] - Suicide Severity Rating Scale Completed?  Yes  If yes, what color did the patient score?  White    Patient Report: Initial Complaint: 48 year old with vague complaints of visual disturbance over the past month. Presents today with visual disturbance, blurriness, wavy vision. Pt also has some PTSD from previous health care experiences and was extremely anxious, hyperventilatory. Better after ATivan.  Plan to have neuro consult.   Focused Assessment: intermittent visual disturbance of blurred wavy vision, no focal deficits found.   Tests Performed: MRI = 6mm  cerebellar infarct  Hgb 8.3 which is what it was one year ago.     Treatments provided:   Ativan 1mg given pre MRI    Family Comments:  at bedside, very supportive and interactive    OBS brochure/video discussed/provided to patient/family: N/A              Name of person given brochure if not patient:               " Relationship to patient:     ED Medications:   Medications   LORazepam (ATIVAN) injection 1 mg (1 mg Intravenous Given 2/28/19 9677)   gadobutrol (GADAVIST) injection 10 mL (10 mLs Intravenous Given 2/28/19 7787)   sodium chloride bacteriostatic 0.9 % flush 30 mL (30 mLs Intravenous Given 2/28/19 5726)       Drips infusing?:  No    For the majority of the shift this patient was Green.   Interventions performed were .    Severe Sepsis OR Septic Shock Diagnosis Present: No    To be done/followed up on inpatient unit:      ED NURSE PHONE NUMBER: [email or web address] [phone]

## 2019-02-28 NOTE — H&P
Admitted:     02/28/2019      PRIMARY CARE PHYSICIAN:  [facility].      CHIEF COMPLAINT:  Blurry vision/vision changes.      HISTORY OF PRESENT ILLNESS:  Ericka Lantigua is a 48-year-old  female with a longstanding history of anxiety, but no other significant medical history.  The patient is a smoker and a daily drinker.  The patient for the last couple of months has had some random, what is termed as visual changes, that are very brief in nature.  They would last for about a minute or so.  The patient woke up in the middle of the night around 3:00 this morning and went to use the bathroom.  She was feeling fine, but she woke up around 6:30 this morning and felt that her vision was changed.  She felt like her vision was not quite normal.  It was maybe blurry or with somewhat of a latency.  Whenever she moved her head the vision was not stabilized and seemed to be disconnected with her head movement.  Because of these concerns, the patient came into [facility] for further assessment.      In the Emergency Department, the patient upon further questioning felt like maybe her left arm was also weak and numb.  The patient was seen by Dr. Nelda Martinez in the Emergency Department.  She was afebrile, initially hypertensive with blood pressure 192/84.  Blood work revealed normal electrolytes and normal kidney function.  Glucose is 109.  CBC showed a white count of 7.4, hemoglobin of 8.3, platelets of 404,000.  INR was 1.  The patient had an MRI of the brain with and without contrast.  This showed a small 6 mm area of restricted diffusion in the left cerebellum   with a recent cerebellar infarct.  No bleed or mass.  An MRI of the brain, angiogram was also done and was normal.  MRA of the neck showed no significant stenosis, no arterial dissection.  The patient was given aspirin and is being admitted for acute CVI.      PAST MEDICAL HISTORY:   1.  Anxiety/PTSD.   2.  Ongoing tobacco  use.   3.  Anemia, chronic.      PAST SURGICAL HISTORY:  None.      FAMILY HISTORY:  Father  of brain aneurysm in his mid 40s.  Mother has atrial fibrillation and is alive.      SOCIAL HISTORY:  .  She works in [employer].  She has smoked a pack a day for over 35 years.  She has 1-2 drinks of rum daily.      ALLERGIES:  NO KNOWN DRUG ALLERGIES.      CURRENT MEDICATIONS:  None.      PAST SURGICAL HISTORY:  None      MEDICATIONS:  None.      REVIEW OF SYSTEMS:  Positive for visual disturbance and some transient left arm weakness that has now resolved.  No speech difficulties, no swallowing difficulties.  No headaches, no chest pain, no black or bloody stools.  No other focal neurologic complaints.  Otherwise, 10-point review of systems was reviewed.  The patient denies any fevers, chills, sweats, rash, headache, neck stiffness, clumsiness, trouble swallowing, lower extremity edema.  Ten-point review of systems otherwise reviewed and negative.      PHYSICAL EXAMINATION:   VITAL SIGNS:  Temperature 99.6, heart rate 91, respirations 16, blood pressure 142/90.  At admission it was 192/84, sats 99% on room air.   GENERAL:  The patient is a 48-year-old  female.  She is pleasant, cooperative, in no acute distress.   HEENT:  Pupils are equal, round, react to light.  Extraocular movements intact.    Tongue is midline.  No facial asymmetry or droop.  Oropharynx shows mucous membranes to be moist.   NECK:  No JVP elevation.  No cervical lymphadenopathy.   PULMONARY:  Lungs are clear to auscultation.   CARDIOVASCULAR:  S1, S2, regular rate and rhythm.  No murmurs, gallops or rubs noted.   ABDOMEN:  Soft, normoactive bowel sounds.   MUSCULOSKELETAL:  No edema.   NEUROLOGIC:  No visual field cuts.  Extraocular movements intact.  She is able to move all 4 extremities.  Strength and sensation are preserved.  Coordination is preserved.      LABORATORY DATA:  As dictated in history of present illness.      ASSESSMENT AND  PLAN:  Singh Powell is a 48-year-old  female with a history of ongoing tobacco use, who does not having any prior medical history or medications, admitted with an acute small cerebellar CVI affecting her vision.      1.  Acute left cerebellar CVI with vision changes.  The patient will be admitted to the stroke unit where she will complete her stroke evaluation upon the neuro floor including an echocardiogram and a formal neurology consultation.  We will also check a lipid profile and start the patient on cholesterol medications.  The patient will receive normal saline.   2.  Elevated blood pressure.  This is likely post-stroke.  The patient will be on p.r.n. labetalol and Vasotec for blood pressure control.   3.  Nicotine dependence.  Smoking cessation consultation will be done.  The patient will receive a nicotine patch.   4.  Deep venous thrombosis prophylaxis.  The patient will receive compression boots.      CODE STATUS:  Full.         LIANA NOVA MD             D: 2019   T: 2019   MT: FAHAD      Name:     SINGH POWELL   MRN:      [id number]        Account:      [id number]   :      1970        Admitted:     2019                   Document: [id number]

## 2019-02-28 NOTE — PROGRESS NOTES
RECEIVING UNIT ED HANDOFF REVIEW    ED Nurse Handoff Report was reviewed by: Merary Arita on February 28, 2019 at 1:31 PM

## 2019-03-01 ENCOUNTER — APPOINTMENT (OUTPATIENT)
Dept: OCCUPATIONAL THERAPY | Facility: CLINIC | Age: 49
DRG: 069 | End: 2019-03-01
Attending: INTERNAL MEDICINE
Payer: COMMERCIAL

## 2019-03-01 ENCOUNTER — APPOINTMENT (OUTPATIENT)
Dept: CARDIOLOGY | Facility: CLINIC | Age: 49
DRG: 069 | End: 2019-03-01
Attending: INTERNAL MEDICINE
Payer: COMMERCIAL

## 2019-03-01 VITALS
HEIGHT: 68 IN | TEMPERATURE: 98.6 F | BODY MASS INDEX: 27.61 KG/M2 | HEART RATE: 80 BPM | WEIGHT: 182.2 LBS | DIASTOLIC BLOOD PRESSURE: 91 MMHG | OXYGEN SATURATION: 98 % | SYSTOLIC BLOOD PRESSURE: 150 MMHG | RESPIRATION RATE: 16 BRPM

## 2019-03-01 LAB
ANION GAP SERPL CALCULATED.3IONS-SCNC: 5 MMOL/L (ref 3–14)
BUN SERPL-MCNC: 10 MG/DL (ref 7–30)
CALCIUM SERPL-MCNC: 8.3 MG/DL (ref 8.5–10.1)
CHLORIDE SERPL-SCNC: 111 MMOL/L (ref 94–109)
CO2 SERPL-SCNC: 23 MMOL/L (ref 20–32)
CREAT SERPL-MCNC: 0.7 MG/DL (ref 0.52–1.04)
DEPRECATED CALCIDIOL+CALCIFEROL SERPL-MC: 13 UG/L (ref 20–75)
ERYTHROCYTE [DISTWIDTH] IN BLOOD BY AUTOMATED COUNT: 19.6 % (ref 10–15)
GFR SERPL CREATININE-BSD FRML MDRD: >90 ML/MIN/{1.73_M2}
GLUCOSE SERPL-MCNC: 94 MG/DL (ref 70–99)
HCT VFR BLD AUTO: 28.4 % (ref 35–47)
HGB BLD-MCNC: 7.7 G/DL (ref 11.7–15.7)
MCH RBC QN AUTO: 17.4 PG (ref 26.5–33)
MCHC RBC AUTO-ENTMCNC: 27.1 G/DL (ref 31.5–36.5)
MCV RBC AUTO: 64 FL (ref 78–100)
PLATELET # BLD AUTO: 356 10E9/L (ref 150–450)
POTASSIUM SERPL-SCNC: 4.1 MMOL/L (ref 3.4–5.3)
RBC # BLD AUTO: 4.43 10E12/L (ref 3.8–5.2)
SODIUM SERPL-SCNC: 139 MMOL/L (ref 133–144)
TROPONIN I SERPL-MCNC: <0.015 UG/L (ref 0–0.04)
WBC # BLD AUTO: 6.3 10E9/L (ref 4–11)

## 2019-03-01 PROCEDURE — 25800030 ZZH RX IP 258 OP 636: Performed by: INTERNAL MEDICINE

## 2019-03-01 PROCEDURE — 99207 ZZC CDG-CODE INCORRECT PER BILLING BASED ON TIME: CPT | Performed by: INTERNAL MEDICINE

## 2019-03-01 PROCEDURE — 97535 SELF CARE MNGMENT TRAINING: CPT | Mod: GO

## 2019-03-01 PROCEDURE — 80048 BASIC METABOLIC PNL TOTAL CA: CPT | Performed by: INTERNAL MEDICINE

## 2019-03-01 PROCEDURE — 85027 COMPLETE CBC AUTOMATED: CPT | Performed by: INTERNAL MEDICINE

## 2019-03-01 PROCEDURE — 93306 TTE W/DOPPLER COMPLETE: CPT | Mod: 26 | Performed by: INTERNAL MEDICINE

## 2019-03-01 PROCEDURE — 97165 OT EVAL LOW COMPLEX 30 MIN: CPT | Mod: GO

## 2019-03-01 PROCEDURE — 25000132 ZZH RX MED GY IP 250 OP 250 PS 637: Performed by: INTERNAL MEDICINE

## 2019-03-01 PROCEDURE — 40000264 ECHOCARDIOGRAM COMPLETE

## 2019-03-01 PROCEDURE — 99239 HOSP IP/OBS DSCHRG MGMT >30: CPT | Performed by: INTERNAL MEDICINE

## 2019-03-01 PROCEDURE — 36415 COLL VENOUS BLD VENIPUNCTURE: CPT | Performed by: INTERNAL MEDICINE

## 2019-03-01 RX ORDER — ROSUVASTATIN CALCIUM 20 MG/1
20 TABLET, COATED ORAL AT BEDTIME
Qty: 30 TABLET | Refills: 1 | Status: SHIPPED | OUTPATIENT
Start: 2019-03-01 | End: 2021-10-13

## 2019-03-01 RX ORDER — ASPIRIN 325 MG
325 TABLET, DELAYED RELEASE (ENTERIC COATED) ORAL DAILY
Qty: 60 TABLET
Start: 2019-03-02 | End: 2021-10-13

## 2019-03-01 RX ORDER — NICOTINE 21 MG/24HR
1 PATCH, TRANSDERMAL 24 HOURS TRANSDERMAL DAILY
Qty: 30 PATCH | Refills: 1 | Status: SHIPPED | OUTPATIENT
Start: 2019-03-02 | End: 2021-10-13

## 2019-03-01 RX ADMIN — ASPIRIN 325 MG: 325 TABLET, DELAYED RELEASE ORAL at 08:39

## 2019-03-01 RX ADMIN — ACETAMINOPHEN 650 MG: 325 TABLET, FILM COATED ORAL at 08:39

## 2019-03-01 RX ADMIN — SODIUM CHLORIDE: 9 INJECTION, SOLUTION INTRAVENOUS at 00:10

## 2019-03-01 RX ADMIN — SODIUM CHLORIDE: 9 INJECTION, SOLUTION INTRAVENOUS at 10:02

## 2019-03-01 RX ADMIN — NICOTINE 1 PATCH: 14 PATCH, EXTENDED RELEASE TRANSDERMAL at 08:39

## 2019-03-01 ASSESSMENT — MIFFLIN-ST. JEOR: SCORE: 1494.95

## 2019-03-01 ASSESSMENT — ACTIVITIES OF DAILY LIVING (ADL)
ADLS_ACUITY_SCORE: 8
PREVIOUS_RESPONSIBILITIES: HOUSEKEEPING;LAUNDRY;SHOPPING;MEDICATION MANAGEMENT;FINANCES;DRIVING;WORK
ADLS_ACUITY_SCORE: 8

## 2019-03-01 NOTE — PLAN OF CARE
A&O. Neuros intact except for slight left eye blurriness. VSS. Tele NSR. Regular diet. Up with SBA. Denies pain. Plan for echo and neuro consult tomorrow.

## 2019-03-01 NOTE — PLAN OF CARE
"Nursing note  Pt a/o x 4, up with sba to the BR voiding fine. Pt denies any dizziness or lightheadedness. Pt denies any n/v. Pt c/o headache this morning medicated with prn tylenol with relief. Neuro's are intact. VSS.BP (!) 150/91 (BP Location: Left arm)   Pulse 80   Temp 98.6  F (37  C) (Oral)   Resp 16   Ht 1.727 m (5' 8\")   Wt 82.6 kg (182 lb 3.2 oz)   LMP 01/31/2019   SpO2 98%   BMI 27.70 kg/m    Pt was discharged home around 1515 all the necessary information was given to pt including the prescriptions. Pt verbalized understanding and signed. Pt accompanied by her . Pt was wheeled down by one of the transport aide.   "

## 2019-03-01 NOTE — DISCHARGE SUMMARY
[facility]  Discharge Summary        Ericka Lantigua MRN# [id number]   YOB: 1970 Age: 48 year old     Date of Admission:  2/28/2019  Date of Discharge:  3/1/2019  Admitting Physician:  Luis Manuel Norton MD  Discharge Physician: Luis Manuel Norton MD  Discharging Service: Hospitalist     Primary Provider:  [facility] in [place]  Primary Care Physician Phone Number: None         Discharge Diagnoses/Problem Oriented Hospital Course (Providers):    Ericka Lantigua was admitted on 2/28/2019 by Luis Manuel Norton MD and I would refer you to their history and physical.  The following problems were addressed during her hospitalization:    ASSESSMENT AND PLAN:  Ericka Lantigua is a 48-year-old  female with a history of ongoing tobacco use, who does not having any prior medical history or medications, admitted with an acute small cerebellar CVI affecting her vision.      1.  Acute left cerebellar CVI with vision changes.    -- symptoms resolved  -- lifelong asa  -- treat lipids  -- echo looked good  -- deficits resolved.  2.  Elevated blood pressure.  This is likely post-stroke.   3.  Nicotine dependence.  Smoking cessation consultation will be done.   -- patient will receive a nicotine patch.   4. Hyperlipidemia  - noted LDL > 100., started crestor.  5.  Deep venous thrombosis prophylaxis.  The patient will receive compression boots.                Code Status:      Full Code         Important Results:      NAD  RONAENT PERRLA,   CV RRR  PULM CTA  ABD soft  Ext no edema         Pending Results:        Unresulted Labs Ordered in the Past 30 Days of this Admission     No orders found for last 61 day(s).               Discharge Instructions and Follow-Up:      Follow-up Appointments     Follow-up and recommended labs and tests       Establish primary care at new PCP in 1-2 weeks  Follow up with neurology in 4-6 weeks. You may call any of the following   neurology clinics for an  appointment or a clinic of your choice. Tell them   you were recently hospitalized and need follow up as recommended at   discharge.    1. [facility]   [address], [address]   [place] [address]   [phone]  2. [facility]   [address]   [place] [address]   [phone]  3. [facility]   [address] [place], [place] [id number] [phone]  4. [facility] - Matthew Bennett MD   [address]   [place], [place] [id number] [phone]         Follow-up and recommended labs and tests       An appointment has been scheduled for hospital follow up and to establish   care with Dr. Debbie Ann for Thursday March 7 at 9:10 a.m.  Please call   the appointment line ([phone]) if you need to reschedule.  [facility]  [address] [place] [place] [address]                  Discharge Disposition:      Discharged to home         Discharge Medications:        Current Discharge Medication List      START taking these medications    Details   aspirin (ASA) 325 MG EC tablet Take 1 tablet (325 mg) by mouth daily  Qty: 60 tablet    Associated Diagnoses: Cerebrovascular accident (CVA), unspecified mechanism (H)      nicotine (NICODERM CQ) 14 MG/24HR 24 hr patch Place 1 patch onto the skin daily  Qty: 30 patch, Refills: 1    Associated Diagnoses: Tobacco dependence syndrome      rosuvastatin (CRESTOR) 20 MG tablet 1 tablet (20 mg) by Oral or NG Tube route At Bedtime  Qty: 30 tablet, Refills: 1    Associated Diagnoses: Hyperlipidemia LDL goal <100                  Allergies:       No Known Allergies         Consultations This Hospital Stay:      Consultation during this admission received from neurology          Discharge Orders for Skilled Facility (from Discharge Orders):        After Care Instructions     Activity      Your activity upon discharge: activity as tolerated          Diet      Follow this diet upon discharge: Orders Placed This Encounter      Low fat diet.                    Rehab orders for Skilled Facility (from Discharge Orders):               Discharge Time:       Greater than 30 minutes.        Image Results From This Hospital Stay (For Non-EPIC Providers):        Results for orders placed or performed during the hospital encounter of 02/28/19   MR Brain w/o & w Contrast    Narrative    MRI BRAIN WITHOUT AND WITH CONTRAST  2/28/2019 10:59 AM    HISTORY:  TIA, initial exam     TECHNIQUE:  Multiplanar, multisequence MRI of the brain without and  with 10 mL Gadavist    COMPARISON: None.    FINDINGS:  There is a small area of restricted diffusion in the left  cerebellum. This is consistent with a recent cerebellar infarct. This  measures only about 6 mm in size. No other areas of restricted  diffusion are identified. There is no evidence for any hemorrhage. No  mass effect.  There are no gadolinium enhancing lesions.   The facial  structures appear normal. The arteries at the base of the brain and  the dural venous sinuses appear patent.       Impression    IMPRESSION:  Small, 6 mm area of restricted diffusion in the left  cerebellum. This is consistent with a recent cerebellar infarct. No  bleed or mass effect.      ADRIANA ADLER MD   MRA Brain ([place]) wo Contrast    Narrative    MR ANGIOGRAM OF THE HEAD WITHOUT CONTRAST   2/28/2019 10:36 AM     HISTORY: TIA with vision changes    TECHNIQUE:  3D time-of-flight MR angiogram of the head without  contrast.    COMPARISON: None.    FINDINGS:  The visualized portions of the distal internal carotid and  vertebral arteries, the basilar artery, [place], and the  proximal anterior, middle and posterior cerebral arteries all appear  normal.  There is no evidence of aneurysm or vascular stenosis or  occlusion.  There are fetal origins of the posterior cerebral arteries  which is a normal variant. The right vertebral  artery terminates in  the right posterior inferior cerebellar artery. This is a normal  variant.      Impression    IMPRESSION:  Normal MR angiogram of the head.      ADRIANA ADLER MD   MRA Neck (Carotids) wo & w Contrast    Narrative    MRA NECK (CAROTIDS) WO & W CONTRAST 2/28/2019 10:59 AM     HISTORY:  TIA evaluation    TECHNIQUE:  Sequential axial images of the neck were obtained using  2-dimensional time-of-flight before contrast and 3-dimensional  time-of-flight after the uneventful administration of 10 mL Gadavist  iv contrast.    COMPARISON:  None.    FINDINGS: Stenosis is relative to the distal internal carotid  diameter.    Right Carotid:  Mild atherosclerotic plaque seen at the right carotid  bifurcation. No significant stenosis is seen at the bifurcation  relative to the distal internal carotid diameter.    Left Carotid: Mild atherosclerotic plaque is seen at the left carotid  bifurcation. No significant stenosis is seen at the bifurcation  relative to the distal internal carotid diameter.    Vertebrals: Antegrade flow is seen in both vertebral arteries.    No arterial dissection is identified.      Impression    IMPRESSION:   1. No significant stenosis is seen at either carotid bifurcation.  2. No arterial dissection is identified.    ADRIANA ADLER MD           Most Recent Lab Results In EPIC (For Non-EPIC Providers):    Most Recent 3 CBC's:  Recent Labs   Lab Test 03/01/19  0830 02/28/19  1847 02/28/19  0930   WBC 6.3 10.2 7.4   HGB 7.7* 7.4* 8.3*   MCV 64* 64* 64*    326 404      Most Recent 3 BMP's:  Recent Labs   Lab Test 03/01/19  0830 02/28/19  1847 02/28/19  0930    140 136   POTASSIUM 4.1 3.9 3.9   CHLORIDE 111* 112* 104   CO2 23 26 25   BUN 10 9 9   CR 0.70 0.68 0.71   ANIONGAP 5 2* 7   LILLY 8.3* 8.3* 8.6   GLC 94 93 109*     Most Recent 3 Troponin's:  Recent Labs   Lab Test 02/28/19  2335 02/28/19  1847 05/31/18  2200   TROPI <0.015 <0.015 <0.015     Most Recent 3 INR's:  Recent Labs    Lab Test 02/28/19  0930   INR 1.00     Most Recent 2 LFT's:  Recent Labs   Lab Test 02/28/19 1847 05/31/18  2200   AST 13 11   ALT 16 15   ALKPHOS 115 102   BILITOTAL 0.1* 0.2     Most Recent Cholesterol Panel:  Recent Labs   Lab Test 02/28/19 1847   CHOL 187   *   HDL 42*   TRIG 152*     Most Recent 6 Bacteria Isolates From Any Culture (See EPIC Reports for Culture Details):No lab results found.  Most Recent TSH, T4 and HgbA1c:  Recent Labs   Lab Test 02/28/19 1847   TSH 3.05   A1C 5.1

## 2019-03-01 NOTE — PLAN OF CARE
Arrived from ED at 1400. A/Ox x4. Neuros intact. NIH 0. Lung sounds clear. Tele NSR. VSS ex mild HTN. Assist with SBA. Regular diet, with thin liquids. BS active, +flatus, last BM yesterday. Denies pain. Skin WNL. Tests/procedures: Echo tomorrow. Therapies recommending to evaluate tomorrow. Plan for discharge once work up complete. Smoking cessation discussed & education provided.

## 2019-03-01 NOTE — DISCHARGE INSTRUCTIONS
A follow-up appointment was scheduled for you [see above].  It is very important to go to it--bring these papers and your insurance card with you.  At the visit, talk about your hospital stay.  Tell your doctor how you feel.  Show your new list of medications.  Your doctor will update records, make sure you are still doing OK, and decide if any tests or medication changes are needed.        Please follow up with ophthalmology as soon as able.

## 2019-03-01 NOTE — PLAN OF CARE
OT: Evaluation and treatment initiated. Pt lives in a rambler style home with her spouse. Prior pt independent in all I/ADLs and in mobility.   Discharge Planner OT   Patient plan for discharge: Home   Current status: Pt went from sit<>Stand with independence and ambulated in her room with SBA. Pt ambulated in the hallway with SBA and completed 10 stairs with Mod I. Completed vision processing skills assessment including assessing saccades, pursuits, convergence/divergence, and pt completed these tasks with no difficulty and with good visual control, visual speed, and visual attention. Pt completed tasks involving reading items in distances and near, and two letter cancellation task, pt scoring 100% accuracy. Pt reported  that blurred vision and related symptoms have completly resolved. No Further IP OT warranted, all IP OT needs met. Pt reported that she is not interested in smoking cessation consult, will complete smoking cessation orders.   Barriers to return to prior living situation: none  Recommendations for discharge: Home with assist as needed for I/ADLs.   Rationale for recommendations: Patient has a strong support system at home and will have assist as needed. Patient admitted with visual blurriness, and vision symptoms have now completly resolved.     Occupational Therapy Discharge Summary    Reason for therapy discharge:    All goals and outcomes met, no further needs identified.    Progress towards therapy goal(s). See goals on Care Plan in [facility] electronic health record for goal details.  Goals met    Therapy recommendation(s):    Home with assist as needed for I/ADLs.                Entered by: Diane Harrison 03/01/2019 11:06 AM

## 2019-03-01 NOTE — PROGRESS NOTES
An appointment has been scheduled for hospital follow up and to establish care with Dr. Debbie Ann at [facility] for Thursday March 7 at 9:10 a.m.

## 2019-03-01 NOTE — PLAN OF CARE
Discharge Planner PT   Patient plan for discharge: home per chart  Current status: PT consult received. 49y/o F admitted with acute left cerebellar CVI with vision changes. Chart reviewed and discussed with OT. Pt currently demonstrating no impairments with mobility IND with all transfers, SBA in hallways and mod IND with stairs.   Barriers to return to prior living situation: none from a mobility perspective  Recommendations for discharge: home with family, defer to OT for recommendations.   Rationale for recommendations: Defer to OT for recommendations. No IP PT needs identified at this time. Will sign off.        Entered by: Saba Orourke 03/01/2019 12:14 PM

## 2019-03-01 NOTE — PLAN OF CARE
A&Ox4. Neuros intact. Denies blurry vision. VSS. Tele NSR. Regular diet. Voiding adequately. Up with SBA. Denies pain. Echo today.

## 2019-03-01 NOTE — PROVIDER NOTIFICATION
MD Notification    Notified Person: MD    Notified Person Name: Hannah Soliman    Notification Date/Time: 2/28/19 0805    Notification Interaction: paged     Purpose of Notification:  MRI showed +L cerebellum stroke. Symptoms had resolved when she arrived on the floor at 1410. Now stating she sees a black line in L eye & some blurry vision. Any additional test/orders needed?     Orders Received: pt would benefit from ophthalmologist  Per Dr. Soliman. No further tests needed.     Comments:

## 2019-03-01 NOTE — PROGRESS NOTES
03/01/19 1015   Quick Adds   Type of Visit Initial Occupational Therapy Evaluation   Living Environment   Lives With spouse;child(meena), adult  (Daughter )   Living Arrangements house  (rambler style home )   Living Environment Comment 3 stairs to get into the home and 12 stairs to lower level, laundry here. Pt right hand dominant.    Self-Care   Usual Activity Tolerance good   Current Activity Tolerance good   Regular Exercise No   Equipment Currently Used at Home none   Functional Level   Ambulation 0-->independent   Transferring 0-->independent   Toileting 0-->independent   Bathing 0-->independent   Dressing 0-->independent   Fall history within last six months no   Which of the above functional risks had a recent onset or change? 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     Cognitive Status Examination   Orientation orientation to person, place and time   Level of Consciousness alert   Visual Perception   Visual Perception Comments Pt wears contacts, pt has contact lens in at the time of evaluation. Pt admitted with blurred vision, pt reported this has resolved.    Sensory Examination   Sensory Quick Adds No deficits were identified   Pain Assessment   Patient Currently in Pain No   Range of Motion (ROM)   ROM Quick Adds No deficits were identified   ROM Comment WNL   Strength   Manual Muscle Testing Quick Adds No deficits were identified   Strength Comments BUE's MMT 5/5 and WNL   Coordination   Coordination Comments No difficulty observed or reported with fine motor coordination.   Mobility   Bed Mobility Bed mobility skill: Sit to supine;Bed mobility skill: Supine to sit   Bed Mobility Skill: Sit to Supine   Level of [place]: Sit/Supine  "independent   Bed Mobility Skill: Supine to Sit   Level of [place]: Supine/Sit independent   Transfer Skills   Transfer Transfer Safety Analysis Bed/Chair;Transfer Skill: Stand to Sit;Transfer Safety Analysis Sit/Stand   Transfer Skill: Bed to Chair/Chair to Bed   Level of [place]: Bed to Chair independent   Transfer Skill: Sit to Stand   Level of [place]: Sit/Stand independent   Toilet Transfer   Toilet Transfer Toilet Transfer Skill;Toilet Transfer Safety Analysis   Transfer Skill: Toilet Transfer   Level of [place]: Toilet independent   Lower Body Dressing   Level of [place]: Dress Lower Body independent   Instrumental Activities of Daily Living (IADL)   Previous Responsibilities housekeeping;laundry;shopping;medication management;finances;driving;work  (Works full time in HR)   General Therapy Interventions   Planned Therapy Interventions ADL retraining;IADL retraining;transfer training;cognition   Clinical Impression   Criteria for Skilled Therapeutic Interventions Met yes, treatment indicated   OT Diagnosis Decreased independence in ADLs, vision impairment impacting  I/ADls    Influenced by the following impairments Decreased independence in ADLs, vision impairment impacting  I/ADls (dressing, bathing, driving)   Assessment of Occupational Performance 1-3 Performance Deficits   Identified Performance Deficits Decreased independence in ADLs, vision impairment impacting  I/ADls  (driving)   Clinical Decision Making (Complexity) Low complexity   Predicted Duration of Therapy Intervention (days/wks) evaluation and one treatment    Anticipated Discharge Disposition Home with Assist   Risks and Benefits of Treatment have been explained. Yes   Patient, Family & other staff in agreement with plan of care Yes   [facility] AM-PAC TM \"6 Clicks\"   2016, Trustees of [facility], under license to [employer].  All rights reserved.   6 Clicks Short Forms Daily Activity Inpatient Short " "Form   [facility] AM-PAC  \"6 Clicks\" Daily Activity Inpatient Short Form   1. Putting on and taking off regular lower body clothing? 4 - None   2. Bathing (including washing, rinsing, drying)? 4 - None   3. Toileting, which includes using toilet, bedpan or urinal? 4 - None   4. Putting on and taking off regular upper body clothing? 4 - None   5. Taking care of personal grooming such as brushing teeth? 4 - None   6. Eating meals? 4 - None   Daily Activity Raw Score (Score out of 24.Lower scores equate to lower levels of function) 24   Total Evaluation Time   Total Evaluation Time (Minutes) 8     "

## 2019-03-01 NOTE — UTILIZATION REVIEW
"  Admission Status; Secondary Review Determination         Under the authority of the Utilization Management Committee, the utilization review process indicated a secondary review on the above patient.  The review outcome is based on review of the medical records, discussions with staff, and applying clinical experience noted on the date of the review.          (x) Observation Status Appropriate - This patient does not meet hospital inpatient criteria and is placed in observation status. If this patient's primary payer is Medicare and was admitted as an inpatient, Condition Code 44 should be used and patient status changed to \"observation\".     RATIONALE FOR DETERMINATION   48-year-old  female with a history of ongoing tobacco use, who does not having any prior medical history or medications, admitted with an acute small cerebellar CVI affecting her vision. Patient did not meet criteria for inpatient admission for the following reasons: On exam in ED, no focal neurological deficits noted. No current blurred vision. Stroke was very small on imaging, expected stay at the time of admission was less than 2 midnights. No TPA used or indicated. The severity of illness, intensity of service provided, expected LOS and risk for adverse outcome make the care appropriate for further observation; however, doesn't meet criteria for hospital inpatient admission. Dr Norton notified of this determination.    This document was produced using voice recognition software.      The information on this document is developed by the utilization review team in order for the business office to ensure compliance.  This only denotes the appropriateness of proper admission status and does not reflect the quality of care rendered.         The definitions of Inpatient Status and Observation Status used in making the determination above are those provided in the CMS Coverage Manual, Chapter 1 and Chapter 6, section 70.4.      Sincerely, "     JOCELYN DAI MD    System Medical Director  Utilization Management  [facility].

## 2019-03-02 LAB — INTERPRETATION ECG - MUSE: NORMAL

## 2019-04-08 ENCOUNTER — HOSPITAL ENCOUNTER (OUTPATIENT)
Dept: LAB | Facility: CLINIC | Age: 49
Discharge: HOME | End: 2019-04-08
Attending: PSYCHIATRY & NEUROLOGY | Admitting: PSYCHIATRY & NEUROLOGY
Payer: COMMERCIAL

## 2019-04-08 DIAGNOSIS — E78.2 MIXED HYPERLIPIDEMIA: ICD-10-CM

## 2019-04-08 DIAGNOSIS — I10 ESSENTIAL HYPERTENSION, MALIGNANT: ICD-10-CM

## 2019-04-08 DIAGNOSIS — E53.1 PYRIDOXINE DEPENDENCY SYNDROME: ICD-10-CM

## 2019-04-08 DIAGNOSIS — R42 VERTIGO AS LATE EFFECT OF STROKE: ICD-10-CM

## 2019-04-08 DIAGNOSIS — I69.398 VERTIGO AS LATE EFFECT OF STROKE: ICD-10-CM

## 2019-04-08 LAB
T3FREE SERPL-MCNC: 2.6 PG/ML (ref 2.3–4.2)
T4 FREE SERPL-MCNC: 0.97 NG/DL (ref 0.76–1.46)
TSH SERPL DL<=0.005 MIU/L-ACNC: 4.61 MU/L (ref 0.4–4)
VIT B12 SERPL-MCNC: 255 PG/ML (ref 193–986)

## 2019-04-08 PROCEDURE — 83921 ORGANIC ACID SINGLE QUANT: CPT | Performed by: PSYCHIATRY & NEUROLOGY

## 2019-04-08 PROCEDURE — 36415 COLL VENOUS BLD VENIPUNCTURE: CPT | Performed by: PSYCHIATRY & NEUROLOGY

## 2019-04-08 PROCEDURE — 84439 ASSAY OF FREE THYROXINE: CPT | Performed by: PSYCHIATRY & NEUROLOGY

## 2019-04-08 PROCEDURE — 82607 VITAMIN B-12: CPT | Performed by: PSYCHIATRY & NEUROLOGY

## 2019-04-08 PROCEDURE — 84481 FREE ASSAY (FT-3): CPT | Performed by: PSYCHIATRY & NEUROLOGY

## 2019-04-08 PROCEDURE — 86800 THYROGLOBULIN ANTIBODY: CPT | Performed by: PSYCHIATRY & NEUROLOGY

## 2019-04-08 PROCEDURE — 84443 ASSAY THYROID STIM HORMONE: CPT | Performed by: PSYCHIATRY & NEUROLOGY

## 2019-04-08 PROCEDURE — 86376 MICROSOMAL ANTIBODY EACH: CPT | Performed by: PSYCHIATRY & NEUROLOGY

## 2019-04-09 LAB
THYROGLOB AB SERPL IA-ACNC: <20 IU/ML (ref 0–40)
THYROPEROXIDASE AB SERPL-ACNC: <10 IU/ML

## 2019-04-10 LAB — METHYLMALONATE SERPL-SCNC: 0.16 UMOL/L (ref 0–0.4)

## 2019-05-09 ENCOUNTER — TRANSFERRED RECORDS (OUTPATIENT)
Dept: HEALTH INFORMATION MANAGEMENT | Facility: CLINIC | Age: 49
End: 2019-05-09

## 2019-05-10 ENCOUNTER — MEDICAL CORRESPONDENCE (OUTPATIENT)
Dept: HEALTH INFORMATION MANAGEMENT | Facility: CLINIC | Age: 49
End: 2019-05-10

## 2019-05-10 DIAGNOSIS — Z72.0 TOBACCO ABUSE: ICD-10-CM

## 2019-05-10 DIAGNOSIS — E78.5 HYPERLIPEMIA: Primary | ICD-10-CM

## 2020-03-15 ENCOUNTER — HEALTH MAINTENANCE LETTER (OUTPATIENT)
Age: 50
End: 2020-03-15

## 2020-07-09 ENCOUNTER — HOSPITAL ENCOUNTER (EMERGENCY)
Facility: CLINIC | Age: 50
Discharge: HOME | End: 2020-07-09
Attending: EMERGENCY MEDICINE | Admitting: EMERGENCY MEDICINE
Payer: COMMERCIAL

## 2020-07-09 VITALS
BODY MASS INDEX: 25.14 KG/M2 | HEART RATE: 60 BPM | RESPIRATION RATE: 20 BRPM | WEIGHT: 165.34 LBS | OXYGEN SATURATION: 96 % | SYSTOLIC BLOOD PRESSURE: 137 MMHG | DIASTOLIC BLOOD PRESSURE: 74 MMHG | TEMPERATURE: 98.9 F

## 2020-07-09 DIAGNOSIS — K21.9 GASTROESOPHAGEAL REFLUX DISEASE WITHOUT ESOPHAGITIS: ICD-10-CM

## 2020-07-09 PROBLEM — D64.9 LOW HEMOGLOBIN: Status: ACTIVE | Noted: 2019-03-07

## 2020-07-09 PROBLEM — F17.200 TOBACCO USE DISORDER: Status: ACTIVE | Noted: 2019-03-07

## 2020-07-09 LAB
ALBUMIN SERPL-MCNC: 4 G/DL (ref 3.4–5)
ALP SERPL-CCNC: 127 U/L (ref 40–150)
ALT SERPL W P-5'-P-CCNC: 32 U/L (ref 0–50)
ANION GAP SERPL CALCULATED.3IONS-SCNC: 5 MMOL/L (ref 3–14)
AST SERPL W P-5'-P-CCNC: 35 U/L (ref 0–45)
BASOPHILS # BLD AUTO: 0.1 10E9/L (ref 0–0.2)
BASOPHILS NFR BLD AUTO: 0.7 %
BILIRUB DIRECT SERPL-MCNC: <0.1 MG/DL (ref 0–0.2)
BILIRUB SERPL-MCNC: 0.3 MG/DL (ref 0.2–1.3)
BUN SERPL-MCNC: 14 MG/DL (ref 7–30)
CALCIUM SERPL-MCNC: 9.5 MG/DL (ref 8.5–10.1)
CHLORIDE SERPL-SCNC: 107 MMOL/L (ref 94–109)
CO2 SERPL-SCNC: 25 MMOL/L (ref 20–32)
CREAT SERPL-MCNC: 0.81 MG/DL (ref 0.52–1.04)
DIFFERENTIAL METHOD BLD: ABNORMAL
EOSINOPHIL # BLD AUTO: 0.1 10E9/L (ref 0–0.7)
EOSINOPHIL NFR BLD AUTO: 1.6 %
ERYTHROCYTE [DISTWIDTH] IN BLOOD BY AUTOMATED COUNT: 13.2 % (ref 10–15)
GFR SERPL CREATININE-BSD FRML MDRD: 84 ML/MIN/{1.73_M2}
GLUCOSE SERPL-MCNC: 135 MG/DL (ref 70–99)
HCT VFR BLD AUTO: 46.8 % (ref 35–47)
HGB BLD-MCNC: 15.8 G/DL (ref 11.7–15.7)
IMM GRANULOCYTES # BLD: 0 10E9/L (ref 0–0.4)
IMM GRANULOCYTES NFR BLD: 0.4 %
INTERPRETATION ECG - MUSE: NORMAL
LIPASE SERPL-CCNC: 204 U/L (ref 73–393)
LYMPHOCYTES # BLD AUTO: 2.1 10E9/L (ref 0.8–5.3)
LYMPHOCYTES NFR BLD AUTO: 25.1 %
MCH RBC QN AUTO: 31.5 PG (ref 26.5–33)
MCHC RBC AUTO-ENTMCNC: 33.8 G/DL (ref 31.5–36.5)
MCV RBC AUTO: 93 FL (ref 78–100)
MONOCYTES # BLD AUTO: 0.6 10E9/L (ref 0–1.3)
MONOCYTES NFR BLD AUTO: 6.8 %
NEUTROPHILS # BLD AUTO: 5.4 10E9/L (ref 1.6–8.3)
NEUTROPHILS NFR BLD AUTO: 65.4 %
NRBC # BLD AUTO: 0 10*3/UL
NRBC BLD AUTO-RTO: 0 /100
PLATELET # BLD AUTO: 213 10E9/L (ref 150–450)
POTASSIUM SERPL-SCNC: 4.4 MMOL/L (ref 3.4–5.3)
PROT SERPL-MCNC: 7.9 G/DL (ref 6.8–8.8)
RBC # BLD AUTO: 5.02 10E12/L (ref 3.8–5.2)
SODIUM SERPL-SCNC: 137 MMOL/L (ref 133–144)
TROPONIN I SERPL-MCNC: <0.015 UG/L (ref 0–0.04)
TROPONIN I SERPL-MCNC: <0.015 UG/L (ref 0–0.04)
WBC # BLD AUTO: 8.2 10E9/L (ref 4–11)

## 2020-07-09 PROCEDURE — 25000132 ZZH RX MED GY IP 250 OP 250 PS 637: Performed by: EMERGENCY MEDICINE

## 2020-07-09 PROCEDURE — 82248 BILIRUBIN DIRECT: CPT | Performed by: EMERGENCY MEDICINE

## 2020-07-09 PROCEDURE — 85025 COMPLETE CBC W/AUTO DIFF WBC: CPT | Performed by: EMERGENCY MEDICINE

## 2020-07-09 PROCEDURE — 84484 ASSAY OF TROPONIN QUANT: CPT | Performed by: EMERGENCY MEDICINE

## 2020-07-09 PROCEDURE — 96374 THER/PROPH/DIAG INJ IV PUSH: CPT

## 2020-07-09 PROCEDURE — 93005 ELECTROCARDIOGRAM TRACING: CPT

## 2020-07-09 PROCEDURE — 99284 EMERGENCY DEPT VISIT MOD MDM: CPT | Mod: 25

## 2020-07-09 PROCEDURE — 80053 COMPREHEN METABOLIC PANEL: CPT | Performed by: EMERGENCY MEDICINE

## 2020-07-09 PROCEDURE — 25000128 H RX IP 250 OP 636: Performed by: EMERGENCY MEDICINE

## 2020-07-09 PROCEDURE — 83690 ASSAY OF LIPASE: CPT | Performed by: EMERGENCY MEDICINE

## 2020-07-09 RX ORDER — ONDANSETRON 2 MG/ML
4 INJECTION INTRAMUSCULAR; INTRAVENOUS EVERY 30 MIN PRN
Status: DISCONTINUED | OUTPATIENT
Start: 2020-07-09 | End: 2020-07-09 | Stop reason: HOSPADM

## 2020-07-09 RX ORDER — ASPIRIN 81 MG/1
324 TABLET, CHEWABLE ORAL ONCE
Status: COMPLETED | OUTPATIENT
Start: 2020-07-09 | End: 2020-07-09

## 2020-07-09 RX ORDER — ALUMINUM HYDROXIDE AND MAGNESIUM CARBONATE 254; 237.5 MG/5ML; MG/5ML
2-4 LIQUID ORAL 4 TIMES DAILY PRN
Qty: 355 ML | Refills: 0 | Status: SHIPPED | OUTPATIENT
Start: 2020-07-09 | End: 2021-10-13

## 2020-07-09 RX ADMIN — ASPIRIN 81 MG 324 MG: 81 TABLET ORAL at 08:52

## 2020-07-09 RX ADMIN — ONDANSETRON 4 MG: 2 INJECTION INTRAMUSCULAR; INTRAVENOUS at 08:57

## 2020-07-09 ASSESSMENT — ENCOUNTER SYMPTOMS
DIARRHEA: 0
NAUSEA: 1
NUMBNESS: 1
FEVER: 0
SHORTNESS OF BREATH: 0

## 2020-07-09 NOTE — ED PROVIDER NOTES
"  History     Chief Complaint:  Chest Pain    HPI   Ericka Lantigua is a 49 year old female smoker taking aspirin who presents with chest pain. The patient woke up this morning at 0530 feeling pain in the middle of her chest, described as intermittent and aching, and nauseous. The pain was not aggravated by movement or expiration. The patient drank water and took Tums but it did not alleviate her symptoms. She reports that she had three bowel movements this morning, stating the nausea was the driving factor. The patient believes that she got herself worked up because she is nervous. She reports her left arm to be \"a little weird and tingling\" and her  reported that her left hand is cold. She has no difficulty or pain with breathing. She denies any swelling or pain in her legs. She denies any diarrhea, rash, fever, sickness or contact with anyone sick. The patient stated that this has not happened before. She has no personal history of heart complications but reports some family history of heart complications. No exertional symptoms, shortness of breath or pleuritic pain. No fever. She denies any abdominal pain.    Of note The patient's  took her blood pressure at home with a reading of 205/120 but the patient reported the next two bp reads to be lower. The patient's  was concerned and wanted the patient to present to the ED.    Allergies:  No Known Allergies     Medications:    Aspirin 325mg   Nicotine patch   Crestor    Past Medical History:    Anxiety   Low hgb   Tobacco use   CVA - 02/2019    Past Surgical History:    The patient does not have any pertinent past surgical history.    Family History:    No past pertinent family history.    Social History:  Smoking Status: Current Every Day Smoker  Alcohol Use: Positive  Marital Status:        Review of Systems   Constitutional: Negative for fever.   Respiratory: Negative for shortness of breath.    Cardiovascular: Positive for chest " pain. Negative for leg swelling.   Gastrointestinal: Positive for nausea. Negative for diarrhea.   Skin: Negative for rash.   Neurological: Positive for numbness (Slight tingling in L arm).   All other systems reviewed and are negative.      Physical Exam     Patient Vitals for the past 24 hrs:   BP Temp Temp src Pulse Heart Rate Resp SpO2 Weight   07/09/20 0900 (!) 158/97 -- -- 93 94 12 97 % --   07/09/20 0845 (!) 180/128 -- -- 117 112 16 98 % --   07/09/20 0838 -- -- -- -- -- -- 100 % --   07/09/20 0836 (!) 190/103 98.9  F (37.2  C) Oral 114 -- 16 -- 75 kg (165 lb 5.5 oz)   07/09/20 0830 (!) 190/103 -- -- -- -- -- -- --       Physical Exam  General: Well appearing, nontoxic. Resting comfortably  Head:  Scalp, face, and head appear normal  Eyes:  Pupils are equal, round, and reactive to light    Conjunctivae non-injected and sclerae white  ENT:    The external nose is normal    Pinnae are normal    The oropharynx is normal, mucous membranes moist    Uvula is in the midline  Neck:  Normal range of motion    There is no rigidity noted    Trachea is in the midline  CV:  Tachycardic rate, regular rhythm    Normal S1/S2, no S3/S4    No murmur or rub. Radial pulses 2+ bilaterally.  Resp:  Lungs are clear and equal bilaterally    There is no tachypnea    No increased work of breathing    No rales, wheezing, or rhonchi  GI:  Abdomen is soft, no rigidity or guarding    No distension, or mass    No tenderness or rebound tenderness   MS:  Normal muscular tone    Symmetric motor strength    No lower extremity edema. No calf swelling or tenderness.  Skin:  No rash or acute skin lesions noted  Neuro: Awake and alert    Speech is normal and fluent    Moves all extremities spontaneously  Psych:  Normal affect.  Appropriate interactions.    Emergency Department Course     ECG:  ECG taken at 0825, ECG read at 0845  Sinus tachycardia  Otherwise normal ECG  Rate 119 bpm. LA interval 132 ms. QRS duration 84 ms. QT/QTc 296/416 ms. P-R-T  axes 75 54 62.    Laboratory:  Laboratory findings were communicated with the patient who voiced understanding of the findings.    CBC: WBC 8.2, HGB 15.8 (H),     CMP: Glucose 135 (H) o/w WNL (Creatinine 0.81)    Troponin (Collected 0846): <0.015   Troponin (Collected 1055): <0.015    Lipase: 204    Bilirubin direct: <0.1    Interventions:  0852 Aspirin 324mg oral   0857 Zofran 4mg IV     Emergency Department Course:    0830 Nursing notes and vitals reviewed.    0825 EKG obtained as noted above.    0845 I performed an exam of the patient as documented above.     0846 IV was inserted and blood was drawn for laboratory testing, results above.    1002 Patient rechecked and updated.     1055 IV was inserted and blood was drawn for laboratory testing, results above.    1155 Findings and plan explained to the Patient. Patient discharged home with instructions regarding supportive care, medications, and reasons to return. The importance of close follow-up was reviewed. The patient was prescribed Gaviscon ES.    Impression & Plan      Medical Decision Making:  Ericka Lantigua is a 49 year old female who presents for evaluation of atypical chest pain.  Pain is not exertional mild and nonpleuritic.  On my evaluation she is well-appearing, hemodynamically stable.  A broad differential diagnosis is considered including acute coronary syndrome, pulmonary embolism, pleurisy, pericarditis, myocarditis, pneumonia, pneumothorax, lateral effusion, pulmonary edema, thoracic mass, among others.  Patient denies any trauma.  Patient is low risk by heart score with a score of 3. Work-up in the ED is thankfully reassuring.  EKG does not show any acute ischemic changes and is without any evidence for dysrhythmia.  Clinical signs and symptoms are not consistent with pulmonary embolism she has no tachycardia, hypoxia or pleuritic pain.  Chest x-ray was obtained and was unremarkable without any evidence of any of the above  pathologies.  Troponin is negative x2.  PE is felt to be clinically unlikely. Ultimately the exact etiology of the patient's pain is not clear.  It is felt most likely to be due to GERD and anxiety.  BP improved significantly during her ED stay without intervention. I recommended close follow-up with her primary care provider. Patient was agreeable to plan of care.  I feel that based on the ED evaluation and reassuring findings the patient is reasonably safe for discharge and close outpatient follow-up.  Close return precautions were provided to the patient.  she should return immediately for any worsening.  The patient was agreeable to plan of care and she was discharged in stable condition.      Diagnosis:    ICD-10-CM    1. Gastroesophageal reflux disease without esophagitis  K21.9        Disposition:   Patient is discharged home.     Discharge Medications:  START taking      Dose / Directions   Gaviscon Extra Strength 508-475 MG/10ML Susp  Generic drug:  Alum Hydroxide-Mag Carbonate      Dose:  2-4 teaspoonful  Take 2-4 teaspoonful by mouth 4 times daily as needed For acid reflux or heart burn symptoms  Quantity:  355 mL  Refills:  0       Scribe Disclosure:  SALVATORE, Jenifer Cruz, am serving as a scribe at 8:38 AM on 7/9/2020 to document services personally performed by Gus Tovar MD based on my observations and the provider's statements to me.   EMERGENCY DEPARTMENT       Gus Tovar MD  07/11/20 [id number]

## 2020-07-09 NOTE — ED AVS SNAPSHOT
Emergency Department  [address] [place] [id number]  Phone:  [phone]  Fax:  [phone]                                    Ericka Lantigua   MRN: [id number]    Department:   Emergency Department   Date of Visit:  7/9/2020           After Visit Summary Signature Page    I have received my discharge instructions, and my questions have been answered. I have discussed any challenges I see with this plan with the nurse or doctor.    ..........................................................................................................................................  Patient/Patient Representative Signature      ..........................................................................................................................................  Patient Representative Print Name and Relationship to Patient    ..................................................               ................................................  Date                                   Time    ..........................................................................................................................................  Reviewed by Signature/Title    ...................................................              ..............................................  Date                                               Time          22EPIC Rev 08/18

## 2021-01-10 ENCOUNTER — HEALTH MAINTENANCE LETTER (OUTPATIENT)
Age: 51
End: 2021-01-10

## 2021-05-08 ENCOUNTER — HEALTH MAINTENANCE LETTER (OUTPATIENT)
Age: 51
End: 2021-05-08

## 2021-10-11 ENCOUNTER — APPOINTMENT (OUTPATIENT)
Dept: MRI IMAGING | Facility: CLINIC | Age: 51
End: 2021-10-11
Attending: PHYSICIAN ASSISTANT
Payer: COMMERCIAL

## 2021-10-11 ENCOUNTER — PATIENT OUTREACH (OUTPATIENT)
Dept: CARE COORDINATION | Facility: CLINIC | Age: 51
End: 2021-10-11

## 2021-10-11 ENCOUNTER — APPOINTMENT (OUTPATIENT)
Dept: CT IMAGING | Facility: CLINIC | Age: 51
End: 2021-10-11
Attending: EMERGENCY MEDICINE
Payer: COMMERCIAL

## 2021-10-11 ENCOUNTER — HOSPITAL ENCOUNTER (EMERGENCY)
Facility: CLINIC | Age: 51
Discharge: HOME | End: 2021-10-11
Attending: EMERGENCY MEDICINE | Admitting: EMERGENCY MEDICINE
Payer: COMMERCIAL

## 2021-10-11 ENCOUNTER — APPOINTMENT (OUTPATIENT)
Dept: MRI IMAGING | Facility: CLINIC | Age: 51
End: 2021-10-11
Attending: EMERGENCY MEDICINE
Payer: COMMERCIAL

## 2021-10-11 VITALS
HEART RATE: 70 BPM | HEIGHT: 68 IN | OXYGEN SATURATION: 96 % | DIASTOLIC BLOOD PRESSURE: 82 MMHG | TEMPERATURE: 99.4 F | SYSTOLIC BLOOD PRESSURE: 117 MMHG | BODY MASS INDEX: 27.28 KG/M2 | RESPIRATION RATE: 13 BRPM | WEIGHT: 180 LBS

## 2021-10-11 DIAGNOSIS — I16.0 HYPERTENSIVE URGENCY: ICD-10-CM

## 2021-10-11 DIAGNOSIS — H53.9 VISION CHANGES: ICD-10-CM

## 2021-10-11 DIAGNOSIS — G45.9 TIA (TRANSIENT ISCHEMIC ATTACK): ICD-10-CM

## 2021-10-11 LAB
ANION GAP SERPL CALCULATED.3IONS-SCNC: 7 MMOL/L (ref 3–14)
APTT PPP: 26 SECONDS (ref 22–38)
ATRIAL RATE - MUSE: 91 BPM
BASOPHILS # BLD AUTO: 0.1 10E3/UL (ref 0–0.2)
BASOPHILS NFR BLD AUTO: 1 %
BUN SERPL-MCNC: 14 MG/DL (ref 7–30)
CALCIUM SERPL-MCNC: 8.9 MG/DL (ref 8.5–10.1)
CHLORIDE BLD-SCNC: 107 MMOL/L (ref 94–109)
CHOLEST SERPL-MCNC: 272 MG/DL
CO2 SERPL-SCNC: 24 MMOL/L (ref 20–32)
CREAT SERPL-MCNC: 0.86 MG/DL (ref 0.52–1.04)
DIASTOLIC BLOOD PRESSURE - MUSE: NORMAL MMHG
EOSINOPHIL # BLD AUTO: 0.3 10E3/UL (ref 0–0.7)
EOSINOPHIL NFR BLD AUTO: 3 %
ERYTHROCYTE [DISTWIDTH] IN BLOOD BY AUTOMATED COUNT: 12.4 % (ref 10–15)
GFR SERPL CREATININE-BSD FRML MDRD: 78 ML/MIN/1.73M2
GLUCOSE BLD-MCNC: 150 MG/DL (ref 70–99)
GLUCOSE BLDC GLUCOMTR-MCNC: 164 MG/DL (ref 70–99)
HBA1C MFR BLD: 5.7 % (ref 0–5.6)
HCT VFR BLD AUTO: 44.9 % (ref 35–47)
HDLC SERPL-MCNC: 47 MG/DL
HGB BLD-MCNC: 14.6 G/DL (ref 11.7–15.7)
HOLD SPECIMEN: NORMAL
IMM GRANULOCYTES # BLD: 0 10E3/UL
IMM GRANULOCYTES NFR BLD: 0 %
INR PPP: 0.92 (ref 0.85–1.15)
INTERPRETATION ECG - MUSE: NORMAL
LDLC SERPL CALC-MCNC: 190 MG/DL
LYMPHOCYTES # BLD AUTO: 2.9 10E3/UL (ref 0.8–5.3)
LYMPHOCYTES NFR BLD AUTO: 30 %
MCH RBC QN AUTO: 30.1 PG (ref 26.5–33)
MCHC RBC AUTO-ENTMCNC: 32.5 G/DL (ref 31.5–36.5)
MCV RBC AUTO: 93 FL (ref 78–100)
MONOCYTES # BLD AUTO: 0.7 10E3/UL (ref 0–1.3)
MONOCYTES NFR BLD AUTO: 7 %
NEUTROPHILS # BLD AUTO: 5.6 10E3/UL (ref 1.6–8.3)
NEUTROPHILS NFR BLD AUTO: 59 %
NONHDLC SERPL-MCNC: 225 MG/DL
NRBC # BLD AUTO: 0 10E3/UL
NRBC BLD AUTO-RTO: 0 /100
P AXIS - MUSE: 69 DEGREES
PLATELET # BLD AUTO: 278 10E3/UL (ref 150–450)
POTASSIUM BLD-SCNC: 3.8 MMOL/L (ref 3.4–5.3)
PR INTERVAL - MUSE: 142 MS
QRS DURATION - MUSE: 84 MS
QT - MUSE: 348 MS
QTC - MUSE: 428 MS
R AXIS - MUSE: 41 DEGREES
RBC # BLD AUTO: 4.85 10E6/UL (ref 3.8–5.2)
SODIUM SERPL-SCNC: 138 MMOL/L (ref 133–144)
SYSTOLIC BLOOD PRESSURE - MUSE: NORMAL MMHG
T AXIS - MUSE: 59 DEGREES
T4 FREE SERPL-MCNC: 0.94 NG/DL (ref 0.76–1.46)
TRIGL SERPL-MCNC: 175 MG/DL
TROPONIN I SERPL-MCNC: <0.015 UG/L (ref 0–0.04)
TSH SERPL DL<=0.005 MIU/L-ACNC: 4.66 MU/L (ref 0.4–4)
VENTRICULAR RATE- MUSE: 91 BPM
WBC # BLD AUTO: 9.5 10E3/UL (ref 4–11)

## 2021-10-11 PROCEDURE — 93005 ELECTROCARDIOGRAM TRACING: CPT

## 2021-10-11 PROCEDURE — 80048 BASIC METABOLIC PNL TOTAL CA: CPT | Performed by: EMERGENCY MEDICINE

## 2021-10-11 PROCEDURE — 70549 MR ANGIOGRAPH NECK W/O&W/DYE: CPT

## 2021-10-11 PROCEDURE — 250N000011 HC RX IP 250 OP 636: Performed by: EMERGENCY MEDICINE

## 2021-10-11 PROCEDURE — 99220 PR INITIAL OBSERVATION CARE,LEVEL III: CPT | Performed by: PSYCHIATRY & NEUROLOGY

## 2021-10-11 PROCEDURE — 255N000002 HC RX 255 OP 636: Performed by: EMERGENCY MEDICINE

## 2021-10-11 PROCEDURE — 85041 AUTOMATED RBC COUNT: CPT | Performed by: EMERGENCY MEDICINE

## 2021-10-11 PROCEDURE — 85610 PROTHROMBIN TIME: CPT | Performed by: EMERGENCY MEDICINE

## 2021-10-11 PROCEDURE — 70450 CT HEAD/BRAIN W/O DYE: CPT | Mod: XS

## 2021-10-11 PROCEDURE — 70496 CT ANGIOGRAPHY HEAD: CPT

## 2021-10-11 PROCEDURE — 99285 EMERGENCY DEPT VISIT HI MDM: CPT | Mod: 25

## 2021-10-11 PROCEDURE — 84484 ASSAY OF TROPONIN QUANT: CPT | Performed by: EMERGENCY MEDICINE

## 2021-10-11 PROCEDURE — 85730 THROMBOPLASTIN TIME PARTIAL: CPT | Performed by: EMERGENCY MEDICINE

## 2021-10-11 PROCEDURE — 80061 LIPID PANEL: CPT | Performed by: EMERGENCY MEDICINE

## 2021-10-11 PROCEDURE — 84439 ASSAY OF FREE THYROXINE: CPT | Performed by: EMERGENCY MEDICINE

## 2021-10-11 PROCEDURE — 83036 HEMOGLOBIN GLYCOSYLATED A1C: CPT | Performed by: EMERGENCY MEDICINE

## 2021-10-11 PROCEDURE — 250N000009 HC RX 250: Performed by: EMERGENCY MEDICINE

## 2021-10-11 PROCEDURE — 250N000013 HC RX MED GY IP 250 OP 250 PS 637: Performed by: EMERGENCY MEDICINE

## 2021-10-11 PROCEDURE — A9585 GADOBUTROL INJECTION: HCPCS | Performed by: EMERGENCY MEDICINE

## 2021-10-11 PROCEDURE — 70544 MR ANGIOGRAPHY HEAD W/O DYE: CPT | Mod: XS

## 2021-10-11 PROCEDURE — 70553 MRI BRAIN STEM W/O & W/DYE: CPT

## 2021-10-11 PROCEDURE — 84443 ASSAY THYROID STIM HORMONE: CPT | Performed by: EMERGENCY MEDICINE

## 2021-10-11 PROCEDURE — 36415 COLL VENOUS BLD VENIPUNCTURE: CPT | Performed by: EMERGENCY MEDICINE

## 2021-10-11 RX ORDER — ASPIRIN 325 MG
325 TABLET ORAL DAILY
Qty: 90 TABLET | Refills: 3 | Status: SHIPPED | OUTPATIENT
Start: 2021-10-11

## 2021-10-11 RX ORDER — ASPIRIN 325 MG
325 TABLET ORAL ONCE
Status: COMPLETED | OUTPATIENT
Start: 2021-10-11 | End: 2021-10-11

## 2021-10-11 RX ORDER — IOPAMIDOL 755 MG/ML
120 INJECTION, SOLUTION INTRAVASCULAR ONCE
Status: COMPLETED | OUTPATIENT
Start: 2021-10-11 | End: 2021-10-11

## 2021-10-11 RX ORDER — CLOPIDOGREL 300 MG/1
300 TABLET, FILM COATED ORAL ONCE
Status: COMPLETED | OUTPATIENT
Start: 2021-10-11 | End: 2021-10-11

## 2021-10-11 RX ORDER — CLOPIDOGREL BISULFATE 75 MG/1
75 TABLET ORAL DAILY
Qty: 30 TABLET | Refills: 0 | Status: SHIPPED | OUTPATIENT
Start: 2021-10-11 | End: 2021-12-21

## 2021-10-11 RX ORDER — GADOBUTROL 604.72 MG/ML
10 INJECTION INTRAVENOUS ONCE
Status: COMPLETED | OUTPATIENT
Start: 2021-10-11 | End: 2021-10-11

## 2021-10-11 RX ORDER — CLOPIDOGREL 300 MG/1
300 TABLET, FILM COATED ORAL DAILY
Status: DISCONTINUED | OUTPATIENT
Start: 2021-10-11 | End: 2021-10-11

## 2021-10-11 RX ADMIN — IOPAMIDOL 70 ML: 755 INJECTION, SOLUTION INTRAVENOUS at 07:15

## 2021-10-11 RX ADMIN — CLOPIDOGREL BISULFATE 300 MG: 300 TABLET, FILM COATED ORAL at 11:36

## 2021-10-11 RX ADMIN — GADOBUTROL 10 ML: 604.72 INJECTION INTRAVENOUS at 08:43

## 2021-10-11 RX ADMIN — ASPIRIN 325 MG ORAL TABLET 325 MG: 325 PILL ORAL at 07:58

## 2021-10-11 RX ADMIN — SODIUM CHLORIDE 100 ML: 900 INJECTION INTRAVENOUS at 07:15

## 2021-10-11 ASSESSMENT — MIFFLIN-ST. JEOR: SCORE: 1474.97

## 2021-10-11 ASSESSMENT — ENCOUNTER SYMPTOMS
WEAKNESS: 0
NAUSEA: 0
VOMITING: 0
SHORTNESS OF BREATH: 0
SPEECH DIFFICULTY: 0

## 2021-10-11 NOTE — ED TRIAGE NOTES
"Reports her vision is off. States her vision is \"tilting\" at times. Similar presentation when she had a stroke a few years ago. Started about 0545.  "

## 2021-10-11 NOTE — LETTER
October 11, 2021      To Whom It May Concern:      Ericka Lantigua was seen in our Emergency Department today, 10/11/21.  I expect her condition to improve over the next 2 days.  She may return to work/school when improved.    Sincerely,        Catie HE RN

## 2021-10-11 NOTE — PROGRESS NOTES
LVMx1 for pt regarding TIA follow-up. Callback number provided.    Will attempt to contact again tomorrow morning.    Anali MONTALVO, RN, SCRN  RN Stroke Neurology Care Coordinator  [facility] Neuroscience Service Line

## 2021-10-11 NOTE — PROGRESS NOTES
ED TIA Pathway patient. Chart reviewed    Neurology phone note while in the hospital: YES    Head CT or Brain MRI completed:YES    Head and Neck Vessel Imaging completed (MRA Head/Neck or CTA Head/Neck):YES    Patient scheduled for Stroke JOHANA Virtual appointment 10/13/21 at 3:00 PM    Please Notify patient of scheduled follow up and Assist with scheduling Echocardiogram and Cardiac Event Monitor     PAMELA JIMÉNEZ CMA

## 2021-10-11 NOTE — ED PROVIDER NOTES
"  History   Chief Complaint:  Eye Problem     HPI   Ericka Lantigua is a 50 year old female with history of cerebral infarction who presents with shifted vision she describes as the \"room is tilted\" when she woke up this morning about an hour ago. She denies any room spinning. She went to bed at about 2200 last night feeling well and at baseline. Mentions that this has happened before when she had a cerebral infarction 3 years ago. She is wearing contacts and currently has them in. Here, she notes that she intermittently sees 2 of everything. Denies chest pain, shortness of breath, nausea, vomiting, extremity weakness, or slurred speech. No recent trauma. Denies aspirin or blood thinner use.     Review of Systems   Eyes: Positive for visual disturbance.   Respiratory: Negative for shortness of breath.    Cardiovascular: Negative for chest pain.   Gastrointestinal: Negative for nausea and vomiting.   Neurological: Negative for speech difficulty and weakness.   All other systems reviewed and are negative.    Allergies:  The patient has no known allergies.     Medications:  The patient is not currently taking any prescribed medications.    Past Medical History:     Anxiety  Cerebral infarction    Social History:  The patient presents to the ED with     Physical Exam     Patient Vitals for the past 24 hrs:   BP Temp Temp src Pulse Resp SpO2 Height Weight   10/11/21 1200 117/82 -- -- 70 -- 96 % -- --   10/11/21 1145 -- -- -- -- -- 94 % -- --   10/11/21 1130 (!) 117/93 -- -- 73 -- -- -- --   10/11/21 1125 -- -- -- 69 -- -- -- --   10/11/21 1020 -- -- -- 78 -- -- -- --   10/11/21 1015 135/87 -- -- -- -- -- -- --   10/11/21 0800 (!) 143/112 -- -- 102 13 97 % -- --   10/11/21 0716 (!) 177/99 -- -- 107 -- 98 % -- --   10/11/21 0709 (!) 182/109 -- -- 105 -- 98 % -- --   10/11/21 0701 -- -- -- -- -- -- 1.727 m (5' 8\") 81.6 kg (180 lb)   10/11/21 0654 (!) 205/121 99.4  F (37.4  C) Temporal (!) 123 18 96 % -- -- "   10/11/21 0650 (!) 193/111 98.4  F (36.9  C) Oral (!) 121 20 99 % -- --     Physical Exam  General: Alert, appears well-developed and well-nourished. Cooperative.     In mild distress, nervous.   HEENT:  Head:  Atraumatic  Ears:  External ears are normal  Mouth/Throat:  Oropharynx is without erythema or exudate and mucous membranes are moist.   Eyes:   Conjunctivae normal and EOM are normal. No scleral icterus.    Pupils are equal, round, and reactive to light.   Neck:   Normal range of motion. Neck supple.  CV:  Tachycardic rate, regular rhythm, normal heart sounds and radial pulses are 2+ and symmetric.  No murmur.  Resp:  Breath sounds are clear bilaterally    Non-labored, no retractions or accessory muscle use  GI:  Abdomen is soft, no distension, no tenderness. No rebound or guarding.  No CVA tenderness bilaterally  MS:  Normal range of motion. No edema.    Normal strength in all 4 extremities.     Back atraumatic.    No midline cervical, thoracic, or lumbar tenderness  Skin:  Warm and dry.  No rash or lesions noted.  Neuro: Alert. Normal strength.  Sensation intact in all 4 extremities. GCS: 15    Cranial nerves 2-12 intact.  Psych:  Normal mood and affect.    Emergency Department Course   ECG  ECG obtained at 0749, ECG read at 0811  Normal sinus rhythm. Normal ECG.   No significant changes as compared to prior, dated 7/9/20.  Rate 91 bpm. MN interval 142 ms. QRS duration 84 ms. QT/QTc 348/428 ms. P-R-T axes 69 41 59.     Imaging:  MRA Neck (Carotids) wo & w Contrast   Final Result   IMPRESSION:  Normal MR angiogram of the neck.          JAY JAY HERNANDEZ MD            SYSTEM ID:  [id number]      MR Brain w/o & w Contrast   Final Result   IMPRESSION:     1. No evidence of acute infarct, mass, hemorrhage, or herniation.   2. Few nonspecific white matter changes likely due to chronic   microvascular ischemic disease.         JAY JAY HERNANDEZ MD            SYSTEM ID:  [id number]      MRA Brain ([place]) wo  Contrast   Final Result   IMPRESSION:  Normal MR angiogram of the head.           JAY JAY HERNANDEZ MD            SYSTEM ID:  [id number]      CTA Head Neck with Contrast   Final Result   IMPRESSION:    1. Patent arteries in the head and neck without vascular cutoff. No   evidence of dissection. No aneurysm identified. No significant   stenosis.    2. Marked emphysematous changes at the visualized lung apices.      Results discussed with Zacarias Lora at 7:26 AM on 10/11/2021.       JAY JAY HERNANDEZ MD            SYSTEM ID:  [id number]      CT Head w/o Contrast   Final Result   IMPRESSION: No evidence of acute intracranial hemorrhage, mass, or   herniation.         JAY JAY HERNANDEZ MD            SYSTEM ID:  [id number]      Cardiac Event Monitor Adult Pediatric    (Results Pending)   Echocardiogram Complete    (Results Pending)     Report per radiology    Laboratory:  Labs Ordered and Resulted from Time of ED Arrival Up to the Time of Departure from the ED   BASIC METABOLIC PANEL - Abnormal; Notable for the following components:       Result Value    Glucose 150 (*)     All other components within normal limits   TSH WITH FREE T4 REFLEX - Abnormal; Notable for the following components:    TSH 4.66 (*)     All other components within normal limits   GLUCOSE BY METER - Abnormal; Notable for the following components:    GLUCOSE BY METER POCT 164 (*)     All other components within normal limits   HEMOGLOBIN A1C - Abnormal; Notable for the following components:    Hemoglobin A1C 5.7 (*)     All other components within normal limits   LIPID REFLEX TO DIRECT LDL PANEL - Abnormal; Notable for the following components:    Cholesterol 272 (*)     Triglycerides 175 (*)     Direct Measure HDL 47 (*)     LDL Cholesterol Calculated 190 (*)     Non HDL Cholesterol 225 (*)     All other components within normal limits    Narrative:     Cholesterol  Desirable:  <200 mg/dL    Triglycerides  Normal:  Less than 150 mg/dL  Borderline High:  150-199  mg/dL  High:  200-499 mg/dL  Very High:  Greater than or equal to 500 mg/dL    Direct Measure HDL  Female:  Greater than or equal to 50 mg/dL   Male:  Greater than or equal to 40 mg/dL    LDL Cholesterol  Desirable:  <100mg/dL  Above Desirable:  100-129 mg/dL   Borderline High:  130-159 mg/dL   High:  160-189 mg/dL   Very High:  >= 190 mg/dL    Non HDL Cholesterol  Desirable:  130 mg/dL  Above Desirable:  130-159 mg/dL  Borderline High:  160-189 mg/dL  High:  190-219 mg/dL  Very High:  Greater than or equal to 220 mg/dL   INR - Normal   TROPONIN I - Normal   PARTIAL THROMBOPLASTIN TIME - Normal   T4 FREE - Normal   CBC WITH PLATELETS AND DIFFERENTIAL   EXTRA BLUE TOP TUBE   EXTRA RED TOP TUBE   EXTRA GREEN TOP (LITHIUM HEPARIN) TUBE   DISCHARGE PLANNING   CBC WITH PLATELETS & DIFFERENTIAL    Narrative:     The following orders were created for panel order CBC with platelets differential.  Procedure                               Abnormality         Status                     ---------                               -----------         ------                     CBC with platelets and d...[[id number]]                      Final result                 Please view results for these tests on the individual orders.   EXTRA TUBE    Narrative:     The following orders were created for panel order [place] Draw.  Procedure                               Abnormality         Status                     ---------                               -----------         ------                     Extra Blue Top Tube[[id number]]                              Final result               Extra Red Top Tube[[id number]]                               Final result               Extra Green Top (Lithium...[[id number]]                      Final result               Extra Purple Top Tube[[id number]]                                                         Please view results for these tests on the individual orders.      Emergency Department  Course:  Reviewed:  I reviewed nursing notes, vitals, past medical history and Care Everywhere    Assessments:  0653 I obtained history and examined the patient as noted above.     0657 CODE STROKE TIER 2 CALLED.     0802 I updated the patient.     1029 I rechecked the patient and explained findings.     1126 I updated the patient prior to discharge.     Consults:  0705 I spoke with Francine East, Stroke Neurology, regarding patient's presentation.     0738 Spoke with Francine East, in the ED, and de-escalated stroke code.     1114 I spoke with the stroke team prior to discharge.     Interventions:  0758 Aspirin 325 mg PO  0843 Gadavist 10 mL IV  1136 Plavix 300 mg PO    Disposition:  The patient was discharged to home.     Impression & Plan     CMS Diagnoses: The patient has stroke symptoms:         ED Stroke specific documentation           NIHSS PDF     Patient last known well time: 2200 10/10/2021  ED Provider first to bedside at: 0653 10/11/2021  CT Results received at: 0726    Thrombolytics:   Not given due to minor/isolated/quickly resolving symptoms and unclear or unfavorable risk-benefit profile for extended window thrombolysis beyond the conventional 4.5 hour time window.    If treating with thrombolytics: Ensure SBP<180 and DBP<105 prior to treatment with thrombolytics.  Administering thrombolytics after treatment with IV labetalol, hydralazine, or nicardipine is reasonable once BP control is established.    Endovascular Retrieval:  Not initiated due to absence of proximal vessel occlusion    National Institutes of [employer] Stroke Scale (Baseline)  Time Performed: 0653     Score    Level of consciousness: (0)   Alert, keenly responsive    LOC questions: (0)   Answers both questions correctly    LOC commands: (0)   Performs both tasks correctly    Best gaze: (0)   Normal    Visual: (0)   No visual loss    Facial palsy: (0)   Normal symmetrical movements    Motor arm (left): (0)   No drift    Motor arm (right):  (0)   No drift    Motor leg (left): (0)   No drift    Motor leg (right): (0)   No drift    Limb ataxia: (0)   Absent    Sensory: (0)   Normal- no sensory loss    Best language: (0)   Normal- no aphasia    Dysarthria: (0)   Normal    Extinction and inattention: (0)   No abnormality        Total Score:  0      Stroke Mimics were considered (including migraine headache, seizure disorder, hypoglycemia (or hyperglycemia), head or spinal trauma, CNS infection, Toxin ingestion and shock state (e.g. sepsis) .    Medical Decision Making:  Patient is a 50-year-old female who presents with vision changes.  A tier 2 stroke code was activated as last well-known time within the last 24 hours.  Patient's initial CT imaging returned unremarkable.  MRI imaging was obtained per recommendation of stroke neurology and thankfully MRI of the brain and angiography of the head and neck returned unremarkable.  Thankfully no evidence of acute stroke.  Stroke neurology did evaluate the patient and recommended continued outpatient work-up for possible TIA as patient's neurologic symptoms have resolved.  It does appear that her neurologic symptoms were present during elevated blood pressures.  As her blood pressures improved, her neurologic symptoms seemed to improve/clear as well.  Unclear if this represented hypertensive urgency/emergency.  BP resolved without intervention.  In discussion and shared decision-making with stroke neurology, will plan for outpatient echocardiogram, cardiac monitor, and primary care follow-up.  We will also load with Plavix and continue 30 days of Plavix and full dose aspirin daily.  Patient felt fully improved with no persistent neurologic symptoms. Primary care appointment set up for the patient prior to discharge from the ED. After all questions answered and return precautions understood, discharged home    Diagnosis:    ICD-10-CM    1. Vision changes  H53.9 Cardiac Event Monitor Adult Pediatric      Echocardiogram Complete     Care Coordination Referral   2. TIA (transient ischemic attack)  G45.9 Cardiac Event Monitor Adult Pediatric     Echocardiogram Complete     Care Coordination Referral   3. Hypertensive urgency  I16.0        Discharge Medications:  Discharge Medication List as of 10/11/2021 12:03 PM      START taking these medications    Details   aspirin (ASA) 325 MG tablet Take 1 tablet (325 mg) by mouth daily, Disp-90 tablet, R-3, Local Print      clopidogrel (PLAVIX) 75 MG tablet Take 1 tablet (75 mg) by mouth daily, Disp-30 tablet, R-0, Local Print           Scribe Disclosure:  I, Saroj Melton, am serving as a scribe at 6:53 AM on 10/11/2021 to document services personally performed by Zacarias Lora MD based on my observations and the provider's statements to me.           Zacarias Lora MD  10/12/21 6485

## 2021-10-12 NOTE — PROGRESS NOTES
"[facility] :  Stroke RNCC TIA Note     SITUATION     Ericka Lantigua is a 50 year old female who is receiving support for:  Clinic Care Coordination - Post Hospital    BACKGROUND     Pt recently seen at [facility] ER for vision changes concerned for possible stroke. Suspected TIA and discharged through expedited workup.    ASSESSMENT     Spoke with pt. She has not had any recurrence of her symptoms, nor has she had any new stroke like symptoms. States she just \"feels tired.\" I acknowledged with her that stroke concerns and all of the workup we are having her do can definitely be draining both physically and mentally.     I confirmed with her that she received her rx for plavix 75mg. She states she has not taken it yet today, but plans to. She is doing asa OTC. She has 81mg tablets as she was previously taking 81mg asa daily for stroke prevention after her CVA in 2019, so she is doing 4 tablets daily for 324mg/day.     I discussed stroke risk factors with her and informed her that her cholesterol levels came back elevated. I explained that the stroke provider may recommend medication in order to lower her cholesterol to decrease her stroke risk. She acknowledged understanding.    She is informed on stroke signs/symptoms. I encouraged her to seek immediate medical attention in the event she is questioning stroke like symptoms. She has my contact information for any other non-urgent questions/concerns she may have moving forward.    Your risk factors for stroke or TIA (transient ischemic attack):    Your Risk Factors Your Results Normal Ranges   High blood pressure BP Readings from Last 1 Encounters:   10/11/21 117/82    Less than 120/80   Cholesterol              Total Lab Results   Component Value Date    CHOL 272 10/11/2021    CHOL 187 02/28/2019      Less than 150    Triglycerides   Lab Results   Component Value Date    TRIG 175 10/11/2021    TRIG 152 02/28/2019    Less than 150   LDL Lab Results   Component " Value Date     10/11/2021     02/28/2019       Less than 70   HDL Lab Results   Component Value Date    HDL 47 10/11/2021    HDL 42 02/28/2019            Greater than 40 (men)  Greater than 50 (women)   Diabetes Hgb A1C : 5.7 10/11/21 Fasting blood glucose    Smoking/tobacco use YES Quit smoking and tobacco   Overweight  Lose 1-2 pounds a week   Lack of exercise  30 minutes moderate activity each day   Other risk factors include carotid (neck) artery disease, atrial fibrillation and stress. You may be on new medicine to treat high blood pressure, cholesterol, diabetes or atrial fibrillation.    Stroke warning signs and symptoms - CALL 911 right away for:  - Sudden numbness or weakness in the face, arm or leg (often on one side of the body).  - Sudden confusion or trouble understanding what is going on.  - Sudden blurred or decreased vision in one or both eyes.  - Sudden trouble speaking, loss of balance, dizziness or problems with coordination.  - Sudden, severe headache for no reason.  - Fainting or seizures.  - Symptoms may go away then come back suddenly.    PLAN     Follow-up plan:  Confirmed upcoming appointments with pt. Stroke JOHANA appt 10/13 at 3:00pm. Stroke RNCC outreach will be as needed unless otherwise requested by JOHANA.     Anali Silva BS, RN, SCRN  RN Stroke Neurology Care Coordinator  [facility] Neuroscience Service Line

## 2021-10-13 ENCOUNTER — VIRTUAL VISIT (OUTPATIENT)
Dept: NEUROLOGY | Facility: CLINIC | Age: 51
End: 2021-10-13
Attending: PHYSICIAN ASSISTANT
Payer: COMMERCIAL

## 2021-10-13 DIAGNOSIS — G45.9 TIA (TRANSIENT ISCHEMIC ATTACK): Primary | ICD-10-CM

## 2021-10-13 PROCEDURE — 99205 OFFICE O/P NEW HI 60 MIN: CPT | Mod: 95

## 2021-10-13 NOTE — NURSING NOTE
Medication list reviewed, patient reports taking supplements  magnesium, K2,  krill oil, D3, zinc, Iron and b12.    PAMELA JIMÉNEZ, CMA

## 2021-10-13 NOTE — PROGRESS NOTES
Georgina is a 50 year old who is being evaluated via a billable video visit.      How would you like to obtain your AVS? MOG    Video Start Time: 1503  Video-Visit Details    Type of service:  Video Visit    Video End Time:1542    Originating Location (pt. Location): Home    Distant Location (provider location):  [facility]     Platform used for Video Visit: [employer]      __________________________________________________________      [facility]    Virtual Clinic - [phone]  __________________________________________________________    Chief Complaint: Patient presents with:  Transient Ischemic Attack      History of Present Illness: Ericka Lantigua is a 50 year old female presenting as a new patient to TIA clinic. She presented to the [facility] emergency department on 10/11/21 because of a vision change.    A couple year ago woke up and couldn't see at all in 2019, found to have cerebellar stroke    Went to bed at a normal time on 10/10/21 and couldn't focus eyes when she woke up. Noticed the symptoms as soon as she turned the light on (it was very dark pre-6 am when she woke up). Could see, but thinks were slanted, double, Put drops in eyes, didn't help. She was then pacing, looking in mirror, etc. Did not have any abnormal vision when looking through each eye separately. First time lasted 5 minutes, then a short improvement, then the vision change came right back so she went to the ER.  Started to feel nauseated with both eyes open so kept one eye closed the whole way to the ER.  [place] very off balance in ER.  Lines on ceiling tiles looking like a V.  BP went down and syptoms got better (early in the ER stay 190s, 117 by the time she left). Symptoms were gone when she left.     She has a past medical history of smoking, prior cerebellar infarct 3 years ago (appers to be an embolic stroke of undetermined source--  incomplete workup, no prolonged outpatient telemetry monitoring), anemia (Details unclear) and elevated BP not on antihypertensives..    TIA Evaluation Summarized  MRI and/or Head CT: MRI brain w & wo 10/11/21: No evidence of acute infarct, mass, hemorrhage, or herniation, chronic microvascular ischemic disease  Intracranial Vascular Imaging: CTA head/neck 10/11/21 : Patent arteries, No dissection, No aneurysm/significant stenosis, marked emphysematous changes at lung apices  Echocardiogram: not yet done*  EKG/Telemetry: SR  LDL: 190  A1c: 5.7  Other testin day monitor not yet done*    ABCD2 Patients Score   Age ? 60 years 1 point 0   Blood Pressure     -SBP ? 140 or DBP ?  90    1 point 1   Clinical Features    -Unilateral weakness   -Speech disturbance w/o weakness    -Other    2 points  1 point    0 points 0   Duration of symptoms    ?60 minutes    10-59 minutes    <10 minutes   2 points  1 point  0 points 1   Diabetes  1 point 0   Patient s ABCD2 Score (0-7) = 2     She was started on aspirin and Plavix for 3 weeks for recurrent stroke prevention. Since the above-mentioned ER visit, she notes that this morning had slight episode today at 0800 -- as she was on the computer, she looked down at keyboard and back up at screen and seemed like vision was shifting.  It did not persist other than when doing those specific vision maneuvers.  She has a mild dull headache.  Has been having it for the past couple days. No history of headaches like migraines. A little buzzing or ringing in L ear today.    Modified Shellie Scale  Score: 1-No significant disability despite symptoms; able to carry out all usual duties and activities    Impression:   Problem List Items Addressed This Visit        Circulatory    TIA (transient ischemic attack) - Primary        50 year old woman with history of stroke and several stroke risk factors who presented with diplopia, the differential diagnosis remains post circulation TIA,  "recrudescence of symptoms of previous stroke (no identifiable cause for the recrudescence such as fever or metabolic derangement - the elevated BP on admission could be cause or effect, so does not support focal brain ischemia necessarily), peripheral vertigo such as BPPV. We will err on the side of safety and assume it was a TIA and do workup for TIA first. Consider ENT eval if symptoms recur    Plan:   - Continue aspirin 325mg and Plavix 75mg together every day for 21 total days, then aspirin 325mg daily alone indefinitely  - Check echocardiogram  - 30 day cardionet monitor (scheduled to start Friday)  - Recommend keeping a daily BP log and also a symptom log for the next week  - Recommend statin for goal LDL 40-70, patient declines at this time as she wants to treat cholesterol naturally  - Clinical trial screening: [facility] (interested)     Stroke Education provided.  She will call us with any questions.  For any acute neurologic deficits she was advised to  go directly to the hospital rather than call the clinic.    Allyson Fournier PA-C  Neurology  10/13/2021 4:54 PM  To page me or covering stroke neurology team member, click here: AMCOM  Choose \"On Call\" tab at top, then search dropdown box for \"Neurology Adult\" & press Enter, look for Neuro ICU/Stroke    ___________________________________________________________________    Current Medications  Current Outpatient Medications   Medication Sig     aspirin (ASA) 325 MG tablet Take 1 tablet (325 mg) by mouth daily     clopidogrel (PLAVIX) 75 MG tablet Take 1 tablet (75 mg) by mouth daily     No current facility-administered medications for this visit.       Past Medical History  Past Medical History:   Diagnosis Date     Anxiety      Cerebral infarction (H)        Social History  Social History     Tobacco Use     Smoking status: Current Every Day Smoker     Packs/day: 1.00     Types: Cigarettes     Smokeless tobacco: Never Used   Substance Use Topics     Alcohol " use: Yes     Comment: daily, 1-2 drinks per day     Drug use: No       Family History  No family history on file.    Physical Exam    Vitals - Patient Reported 10/13/2021   Weight (Patient Reported) 180 lb         General:  no acute distress  HEENT:  normocephalic/atraumatic  Pulmonary:  no respiratory distress    Neurologic  Mental Status:  alert, oriented x 3, follows commands, speech clear and fluent,   Cranial Nerves:  EOMI with normal smooth pursuit, facial movements symmetric, hearing not formally tested but intact to conversation, no dysarthria  Motor:  deferred  Reflexes:  unable to test (telestroke)  Sensory:  unable to test (telestroke)  Coordination:  deferred  Station/Gait:  unable to test (telestroke)    Neuroimaging: as per HPI. I personally reviewed those images    Labs:    Coagulation studies:  Recent Labs   Lab Test 10/11/21  0702 02/28/19  0930   INR 0.92 1.00        Lipid panel:  Recent Labs   Lab Test 10/11/21  0702 02/28/19  1847   CHOL 272* 187   HDL 47* 42*   * 115*   TRIG 175* 152*       HbA1C:  Recent Labs   Lab Test 10/11/21  0702 02/28/19  1847   A1C 5.7* 5.1         Billing:    I spent a total of 62 minutes on the day of the visit.   Time spent doing chart review, history and exam, documentation and further activities per the note

## 2021-10-13 NOTE — LETTER
10/13/2021         RE: Ericka Lantigua  [address] [facility] [address]        Dear Colleague,    Thank you for referring your patient, Ericka Lantigua, to the [facility] NEUROLOGY CLINIC [place]. Please see a copy of my visit note below.    Georgina is a 50 year old who is being evaluated via a billable video visit.      How would you like to obtain your AVS? Solstice    Video Start Time: 1503  Video-Visit Details    Type of service:  Video Visit    Video End Time:1542    Originating Location (pt. Location): Home    Distant Location (provider location):  [facility] NEUROLOGY [facility]     Platform used for Video Visit: AdviseHub      __________________________________________________________      [facility] Vascular Neurology Stroke Clinic    Virtual Clinic - [phone]  __________________________________________________________    Chief Complaint: Patient presents with:  Transient Ischemic Attack      History of Present Illness: Ericka Lantigua is a 50 year old female presenting as a new patient to TIA clinic. She presented to the [facility] emergency department on 10/11/21 because of a vision change.    A couple year ago woke up and couldn't see at all in 2019, found to have cerebellar stroke    Went to bed at a normal time on 10/10/21 and couldn't focus eyes when she woke up. Noticed the symptoms as soon as she turned the light on (it was very dark pre-6 am when she woke up). Could see, but thinks were slanted, double, Put drops in eyes, didn't help. She was then pacing, looking in mirror, etc. Did not have any abnormal vision when looking through each eye separately. First time lasted 5 minutes, then a short improvement, then the vision change came right back so she went to the ER.  Started to feel nauseated with both eyes open so kept one eye closed the whole way to the ER.  [place] very off balance in ER.  Lines on ceiling tiles looking like a V.   BP went down and syptoms got better (early in the ER stay 190s, 117 by the time she left). Symptoms were gone when she left.     She has a past medical history of smoking, prior cerebellar infarct 3 years ago (appers to be an embolic stroke of undetermined source-- incomplete workup, no prolonged outpatient telemetry monitoring), anemia (Details unclear) and elevated BP not on antihypertensives..    TIA Evaluation Summarized  MRI and/or Head CT: MRI brain w & wo 10/11/21: No evidence of acute infarct, mass, hemorrhage, or herniation, chronic microvascular ischemic disease  Intracranial Vascular Imaging: CTA head/neck 10/11/21 : Patent arteries, No dissection, No aneurysm/significant stenosis, marked emphysematous changes at lung apices  Echocardiogram: not yet done*  EKG/Telemetry: SR  LDL: 190  A1c: 5.7  Other testin day monitor not yet done*    ABCD2 Patients Score   Age ? 60 years 1 point 0   Blood Pressure     -SBP ? 140 or DBP ?  90    1 point 1   Clinical Features    -Unilateral weakness   -Speech disturbance w/o weakness    -Other    2 points  1 point    0 points 0   Duration of symptoms    ?60 minutes    10-59 minutes    <10 minutes   2 points  1 point  0 points 1   Diabetes  1 point 0   Patient s ABCD2 Score (0-7) = 2     She was started on aspirin and Plavix for 3 weeks for recurrent stroke prevention. Since the above-mentioned ER visit, she notes that this morning had slight episode today at 0800 -- as she was on the computer, she looked down at keyboard and back up at screen and seemed like vision was shifting.  It did not persist other than when doing those specific vision maneuvers.  She has a mild dull headache.  Has been having it for the past couple days. No history of headaches like migraines. A little buzzing or ringing in L ear today.    Modified [place] Scale  Score: 1-No significant disability despite symptoms; able to carry out all usual duties and activities    Impression:   Problem List  "Items Addressed This Visit        Circulatory    TIA (transient ischemic attack) - Primary        50 year old woman with history of stroke and several stroke risk factors who presented with diplopia, the differential diagnosis remains post circulation TIA, recrudescence of symptoms of previous stroke (no identifiable cause for the recrudescence such as fever or metabolic derangement - the elevated BP on admission could be cause or effect, so does not support focal brain ischemia necessarily), peripheral vertigo such as BPPV. We will err on the side of safety and assume it was a TIA and do workup for TIA first. Consider ENT eval if symptoms recur    Plan:   - Continue aspirin 325mg and Plavix 75mg together every day for 21 total days, then aspirin 325mg daily alone indefinitely  - Check echocardiogram  - 30 day cardionet monitor (scheduled to start Friday)  - Recommend keeping a daily BP log and also a symptom log for the next week  - Recommend statin for goal LDL 40-70, patient declines at this time as she wants to treat cholesterol naturally  - Clinical trial screening: mGLGALA (interested)     Stroke Education provided.  She will call us with any questions.  For any acute neurologic deficits she was advised to  go directly to the hospital rather than call the clinic.    Allyson Fournier PA-C  Neurology  10/13/2021 4:54 PM  To page me or covering stroke neurology team member, click here: AMCOM  Choose \"On Call\" tab at top, then search dropdown box for \"Neurology Adult\" & press Enter, look for Neuro ICU/Stroke    ___________________________________________________________________    Current Medications  Current Outpatient Medications   Medication Sig     aspirin (ASA) 325 MG tablet Take 1 tablet (325 mg) by mouth daily     clopidogrel (PLAVIX) 75 MG tablet Take 1 tablet (75 mg) by mouth daily     No current facility-administered medications for this visit.       Past Medical History  Past Medical History:   Diagnosis " Date     Anxiety      Cerebral infarction (H)        Social History  Social History     Tobacco Use     Smoking status: Current Every Day Smoker     Packs/day: 1.00     Types: Cigarettes     Smokeless tobacco: Never Used   Substance Use Topics     Alcohol use: Yes     Comment: daily, 1-2 drinks per day     Drug use: No       Family History  No family history on file.    Physical Exam    Vitals - Patient Reported 10/13/2021   Weight (Patient Reported) 180 lb         General:  no acute distress  HEENT:  normocephalic/atraumatic  Pulmonary:  no respiratory distress    Neurologic  Mental Status:  alert, oriented x 3, follows commands, speech clear and fluent,   Cranial Nerves:  EOMI with normal smooth pursuit, facial movements symmetric, hearing not formally tested but intact to conversation, no dysarthria  Motor:  deferred  Reflexes:  unable to test (telestroke)  Sensory:  unable to test (telestroke)  Coordination:  deferred  Station/Gait:  unable to test (telestroke)    Neuroimaging: as per HPI. I personally reviewed those images    Labs:    Coagulation studies:  Recent Labs   Lab Test 10/11/21  0702 02/28/19  0930   INR 0.92 1.00        Lipid panel:  Recent Labs   Lab Test 10/11/21  0702 02/28/19  1847   CHOL 272* 187   HDL 47* 42*   * 115*   TRIG 175* 152*       HbA1C:  Recent Labs   Lab Test 10/11/21  0702 02/28/19  1847   A1C 5.7* 5.1         Billing:    I spent a total of 62 minutes on the day of the visit.   Time spent doing chart review, history and exam, documentation and further activities per the note            Again, thank you for allowing me to participate in the care of your patient.        Sincerely,         Neurology Stroke JOHANA

## 2021-10-14 ENCOUNTER — OFFICE VISIT (OUTPATIENT)
Dept: FAMILY MEDICINE | Facility: CLINIC | Age: 51
End: 2021-10-14
Payer: COMMERCIAL

## 2021-10-14 VITALS
OXYGEN SATURATION: 99 % | HEART RATE: 106 BPM | TEMPERATURE: 99.3 F | WEIGHT: 175 LBS | SYSTOLIC BLOOD PRESSURE: 140 MMHG | DIASTOLIC BLOOD PRESSURE: 88 MMHG | RESPIRATION RATE: 16 BRPM | BODY MASS INDEX: 26.61 KG/M2

## 2021-10-14 DIAGNOSIS — Q21.12 PFO (PATENT FORAMEN OVALE): ICD-10-CM

## 2021-10-14 DIAGNOSIS — R03.0 ELEVATED BLOOD PRESSURE READING WITHOUT DIAGNOSIS OF HYPERTENSION: ICD-10-CM

## 2021-10-14 DIAGNOSIS — G45.9 TIA (TRANSIENT ISCHEMIC ATTACK): Primary | ICD-10-CM

## 2021-10-14 DIAGNOSIS — Z71.6 TOBACCO ABUSE COUNSELING: ICD-10-CM

## 2021-10-14 PROCEDURE — 99204 OFFICE O/P NEW MOD 45 MIN: CPT | Performed by: PHYSICIAN ASSISTANT

## 2021-10-14 RX ORDER — NICOTINE 21 MG/24HR
1 PATCH, TRANSDERMAL 24 HOURS TRANSDERMAL EVERY 24 HOURS
Qty: 30 PATCH | Refills: 1 | Status: SHIPPED | OUTPATIENT
Start: 2021-10-14 | End: 2021-12-21

## 2021-10-14 RX ORDER — ROSUVASTATIN CALCIUM 10 MG/1
10 TABLET, COATED ORAL DAILY
Qty: 30 TABLET | Refills: 5 | Status: SHIPPED | OUTPATIENT
Start: 2021-10-14 | End: 2021-12-21

## 2021-10-14 NOTE — PROGRESS NOTES
Assessment & Plan     TIA (transient ischemic attack)  Discussed patient's risk factors. She is hesitant to start a statin. We discussed trying a low dose Crestor for now. Continue to work on diet.  Patient has cut back on her smoking significantly. She is working towards quitting. Discussed ways to help with quitting and also prescribed nicotine patches to help patient with cravings.  Blood pressure still high in clinic today. Rechecked several times. Will wait to start medications but did discuss diet to help with lowering blood pressure without medications. Patient will be checking blood pressure at home.  - rosuvastatin (CRESTOR) 10 MG tablet; Take 1 tablet (10 mg) by mouth daily    Tobacco abuse counseling  As noted above.  - nicotine (NICODERM CQ) 14 MG/24HR 24 hr patch; Place 1 patch onto the skin every 24 hours  - nicotine (NICODERM CQ) 7 MG/24HR 24 hr patch; Place 1 patch onto the skin every 24 hours    Elevated blood pressure reading without diagnosis of hypertension    Review of external notes as documented elsewhere in note  Prescription drug management  50 minutes spent on the date of the encounter doing chart review, history and exam, documentation and further activities per the note       Tobacco Cessation:   reports that she has been smoking cigarettes. She has been smoking about 1.00 pack per day. She has never used smokeless tobacco.  Tobacco Cessation Action Plan: Pharmacotherapies : Nicotine patch    See Patient Instructions    Return in about 2 months (around 12/14/2021) for Physical Exam/establish care, In Person.    Sonali Hawkins PA-C  [facility]    Tylor Erickson is a 50 year old who presents for the following health issues     History of Present Illness       She eats 0-1 servings of fruits and vegetables daily.She consumes 2 sweetened beverage(s) daily.She exercises with enough effort to increase her heart rate 9 or less minutes per day.  She  "exercises with enough effort to increase her heart rate 3 or less days per week.   She is taking medications regularly.       ED/UC Followup:    Facility:  [facility]  Date of visit: 10/11/2021  Reason for visit: TIA  Current Status: had an episode yesterday and this morning of visual changes     Patient is a 50 year old female with history of CVA who presents today for follow-up from the ER.  Patient seen and evaluated in the ER on 10/11/2021 for evaluation of of vision changes that she describes as \"room is tilted\".  Patient presents to the ER after she woke in the morning with the sensation.  Patient has history of CVA approximately 3 years ago.  She wears contacts but occasionally sees double.  Patient had thorough evaluation including BMP, A1c, lipid, INR, thyroid testing, CBC as well as imaging including CT head, CTA head and neck as well as MRI of the brain with and without contrast and MRA of the neck with and without contrast.  All imaging was grossly normal.  Neurology did evaluate the patient and recommended outpatient work-up for possible TIA as symptoms have resolved.  Patient symptoms were present with elevated blood pressures and improved when blood pressures improved.  Per ER note it is unclear if this was hypertensive urgency or emergency.  Blood pressure resolved without intervention.  Patient was started on Plavix and recommended to continue Plavix as well as a full aspirin for the next 30 days.  Patient seen and evaluated by neurology on 10/13/2021.  It was recommended that patient continue on a full aspirin as well as Plavix 75 mg for the next 21 days.  Was also recommended she continue a full aspirin indefinitely.  Cardiac monitor and echo have been ordered.  A statin was recommended for a goal LDL of 40-70 with patient declined starting statin at that time.    She notes is still having episodes of vision changes (feels like things are \"slanting\" and like being in a \"fun " "room\").      Review of Systems   GENERAL:  No fevers  NEURO:  As noted in HPI        Objective    BP (!) 140/88   Pulse 106   Temp 99.3  F (37.4  C) (Oral)   Resp 16   Wt 79.4 kg (175 lb)   SpO2 99%   BMI 26.61 kg/m    Body mass index is 26.61 kg/m .  Physical Exam   GENERAL: No acute distress  HEENT: Normocephalic  NEURO: Alert and non-focal          "

## 2021-10-14 NOTE — LETTER
October 14, 2021      Ericka Lantigua  [address] [facility] [address]        To Whom It May Concern:    Ericka Lantigua was seen in our clinic. She may return to work with the following: please allow patient to work from home and take breaks as needed for symptoms. This will be in place for the next 6 weeks (11/29/21). She can return to work with these restrictions on 10/18/21.      Sincerely,        Sonali Hawkins PA-C

## 2021-10-14 NOTE — PATIENT INSTRUCTIONS
Patient Education     Low-Salt Choices  Eating salt (sodium) can make your body retain too much water. Extra water makes your heart work harder. Canned, packaged, and frozen foods are easy to prepare. But they are often high in sodium. Here are some ideas for low-salt foods you can easily make yourself.   For breakfast    Fruit or 100% fruit juice. It's better to have whole fruit instead of 100% fruit juice.    Whole-wheat bread or an English muffin. Look for sodium content on Nutrition Facts labels.    Low-fat milk or yogurt    Unsalted eggs    Shredded wheat    Corn tortillas    Unsalted steamed rice    Regular (not instant) hot cereal, made without salt  Stay away from    Sausage, chamberlain, and ham    Flour tortillas    Packaged muffins, pancakes, and biscuits    Instant hot cereals    Cottage cheese    For lunch and dinner    Fresh fish, chicken, turkey, or meat--baked, broiled, or roasted without salt    Dry beans, cooked without salt    Tofu, stir-fried without salt    Unsalted fresh fruit and vegetables, or frozen or canned fruit and vegetables with no added salt  Stay away from    Lunch or deli meat that is cured or smoked    Cheese    Tomato juice and ketchup    Canned vegetables, soups, and fish not labeled as no-salt-added or reduced sodium    Packaged gravies and sauces    Olives, pickles, and relish    Bottled salad dressings    For snacks and desserts    Yogurt    Unsalted, air-popped popcorn    Unsalted nuts or seeds  Stay away from    Pies and cakes    Packaged dessert mixes    Pizza    Canned and packaged puddings    Pretzels, chips, crackers, and nuts--unless the label says unsalted    [employer] last reviewed this educational content on 11/1/2019 2000-2021 [employer]. All rights reserved. This information is not intended as a substitute for professional medical care. Always follow your healthcare professional's instructions.           Patient Education     Eating Heart-Healthy Food:  Using the DASH Plan  Eating for your heart doesn t have to be hard or boring. You just need to know how to make healthier choices. The DASH eating plan has been developed to help you do just that. DASH stands for Dietary Approaches to Stop Hypertension. It is a plan that has been proven to be healthier for your heart and to lower your risk for high blood pressure. It can also help lower your risk for cancer, heart disease, osteoporosis, and diabetes.  Choosing from each food group  Choose foods from each of the food groups below each day. Try to get the recommended number of servings for each food group. The serving numbers are based on a diet of 2,000 calories a day. Talk with your healthcare provider if you re not sure about your calorie needs. Along with getting the correct servings, the DASH plan also advises less than 2,300 mg of salt (sodium) per day. Lowering sodium intake to 1,500 mg per day lowers blood pressure even more. (There's about 2,300 mg of sodium in 1 teaspoon of salt.)      Grains  Servings: 6 to 8 a day  A serving is:    1 slice bread    1 ounce dry cereal    Half a cup cooked rice, pasta or cereal  Best choices: Whole grains and any grains high in fiber. Vegetables  Servings: 4 to 5 a day  A serving is:    1 cup raw leafy vegetable    Half a cup cut-up raw or cooked vegetable    Half a cup vegetable juice  Best choices: Fresh or frozen vegetables prepared without added salt or fat.   Fruits  Servings: 4 to 5 a day  A serving is:    1 medium fruit    One-quarter cup dried fruit    Half a cup fresh, frozen, or canned fruit    Half a cup of 100% fruit juices  Best choices: A variety of fresh fruits of different colors. Whole fruits are a better choice than fruit juices. Low-fat or fat-free dairy  Servings: 2 to 3 a day  A serving is:    1 cup milk    1 cup yogurt    One and a half ounces cheese  Best choices: Skim or 1% milk, low-fat or fat-free yogurt or buttermilk, and low-fat cheeses.          Lean meats, poultry, fish  Servings: 6 or fewer a day  A serving is:    1 ounce cooked meats, poultry, or fish    1 egg  Best choices: Lean poultry and fish. Trim away visible fat. Broil, grill, roast, or boil instead of frying. Remove skin from poultry before eating. Limit how much red meat you eat.  Nuts, seeds, beans  Servings: 4 to 5 a week  A serving is:    One-third cup nuts (one and a half ounces)    2 tablespoons nut butter or seeds    Half a cup cooked dry beans or legumes  Best choices: Dry roasted nuts with no salt added, lentils, kidney beans, garbanzo beans, and whole lal beans.   Fats and oils  Servings: 2 to 3 a day  A serving is:    1 teaspoon vegetable oil    1 teaspoon soft margarine    1 tablespoon mayonnaise    2 tablespoons salad dressing  Best choices: Nut and vegetable oils (nontropical vegetable oils), such as olive and canola oil. Sweets  Servings: 5 a week or fewer  A serving is:    1 tablespoon sugar, maple syrup, or honey    1 tablespoon jam or jelly    1 half-ounce jelly beans (about 15)    1 cup lemonade  Best choices: Dried fruit can be a satisfying sweet. Choose low-fat sweets. And watch your serving sizes!      For more on the DASH eating plan, visit:  www.nhlbi.nih.gov/health/health-topics/topics/dash   Matt last reviewed this educational content on 7/1/2019 2000-2021 [employer]. All rights reserved. This information is not intended as a substitute for professional medical care. Always follow your healthcare professional's instructions.

## 2021-10-14 NOTE — LETTER
October 14, 2021      Ericka Lantigua  [address] [address]        To Whom It May Concern:    Ericka Lantigua was seen in our clinic. She may return to work with the following: please allow patient to work from home and take breaks as needed for symptoms.      Sincerely,        Sonali Hawkins PA-C

## 2021-10-15 ENCOUNTER — HOSPITAL ENCOUNTER (OUTPATIENT)
Dept: CARDIOLOGY | Facility: CLINIC | Age: 51
End: 2021-10-15
Attending: EMERGENCY MEDICINE
Payer: COMMERCIAL

## 2021-10-15 ENCOUNTER — TELEPHONE (OUTPATIENT)
Dept: NEUROLOGY | Facility: CLINIC | Age: 51
End: 2021-10-15

## 2021-10-15 DIAGNOSIS — G45.9 TIA (TRANSIENT ISCHEMIC ATTACK): ICD-10-CM

## 2021-10-15 DIAGNOSIS — H53.9 VISION CHANGES: ICD-10-CM

## 2021-10-15 PROCEDURE — 93306 TTE W/DOPPLER COMPLETE: CPT

## 2021-10-15 PROCEDURE — 93306 TTE W/DOPPLER COMPLETE: CPT | Mod: 26 | Performed by: INTERNAL MEDICINE

## 2021-10-15 PROCEDURE — 93270 REMOTE 30 DAY ECG REV/REPORT: CPT

## 2021-10-15 PROCEDURE — 93272 ECG/REVIEW INTERPRET ONLY: CPT | Performed by: INTERNAL MEDICINE

## 2021-10-15 NOTE — TELEPHONE ENCOUNTER
I just got pt's echo report and BP was high even there. It's been high consistently it seems. I saw her the other day and asked her to keep a BP log. Could you please call her Tuesday and if it's been high through the weekend I really recommend we or PCP start treating her. She just saw PCP yesterday who recommended still keeping the log. I think by Tues we will have enough proof to treat. I'm going to be off. Thanks.

## 2021-10-20 NOTE — TELEPHONE ENCOUNTER
LVM for patient explaining that I am following up on her blood pressure log. Callback number provided.    Anali MONTALVO, RN, SCRN  RN Stroke Neurology Care Coordinator  [facility] Neuroscience Service Line

## 2021-10-22 NOTE — TELEPHONE ENCOUNTER
LVMx2 for pt regarding follow-up on BP log. Callback number provided.    Anali MONTALVO, RN, SCRN  RN Stroke Neurology Care Coordinator  [facility] Neuroscience Service Line

## 2021-10-23 ENCOUNTER — HEALTH MAINTENANCE LETTER (OUTPATIENT)
Age: 51
End: 2021-10-23

## 2021-10-26 ENCOUNTER — MYC MEDICAL ADVICE (OUTPATIENT)
Dept: NEUROLOGY | Facility: CLINIC | Age: 51
End: 2021-10-26
Payer: COMMERCIAL

## 2021-10-26 NOTE — TELEPHONE ENCOUNTER
LVMx3 for pt regarding blood pressure log. Callback number provided. Also sent a [employer] message.    Stroke RNCC will make no further outreaches. Can assist pt if she reaches out.    Anali MONTALVO, RN, SCRN  RN Stroke Neurology Care Coordinator  [facility] Neuroscience Service Line

## 2021-11-05 ENCOUNTER — MYC MEDICAL ADVICE (OUTPATIENT)
Dept: NEUROLOGY | Facility: CLINIC | Age: 51
End: 2021-11-05
Payer: COMMERCIAL

## 2021-11-05 NOTE — TELEPHONE ENCOUNTER
PARAM to PCP--  I don't recommend cardiology eval yet for PFO since the patient has a low ROPE score of 3-4. I would like to see her back and discuss with her and perhaps have her seen by us in neurocardiology clinic in the future for further discussion if cardionet monitor is negative.

## 2021-11-05 NOTE — TELEPHONE ENCOUNTER
RNCC will outreach to patient next week if we do not hear back via [employer]. I had been unable to reach her regarding blood pressure a couple weeks ago.    Anali Silva BS, RN, SCRN  RN Stroke Neurology Care Coordinator  [facility] Neuroscience Service Line

## 2021-11-10 NOTE — TELEPHONE ENCOUNTER
LVMx1 for pt regarding recommendation for follow-up appt with our stroke team. Callback number provided.    Anali Silva BS, RN, SCRN  RN Stroke Neurology Care Coordinator  [facility] Neuroscience Service Line

## 2021-11-11 NOTE — TELEPHONE ENCOUNTER
LVMx2 for patient regarding follow-up after her stroke JOHANA visit. Callback number provided.    Anali MONTALVO, RN, SCRN  RN Stroke Neurology Care Coordinator  [facility] Neuroscience Service Line

## 2021-11-15 NOTE — TELEPHONE ENCOUNTER
LVMx3 for pt explaining we'd like to set up follow-up and I was inquiring how BP monitoring was going. No further outreach on my end. We[employer] message sent by me on 10/26 was read by pt. Message sent on 11/5 by Nayeli CABALLERO has not been read yet.    Routing to Nayeli and pt's PCP for awareness.    Anali Silva BS, RN, SCRN  RN Stroke Neurology Care Coordinator  [facility] Neuroscience Service Line

## 2021-11-19 ENCOUNTER — TELEPHONE (OUTPATIENT)
Dept: FAMILY MEDICINE | Facility: CLINIC | Age: 51
End: 2021-11-19
Payer: COMMERCIAL

## 2021-11-19 NOTE — TELEPHONE ENCOUNTER
----- Message from oSnali Hawkins PA-C sent at 11/19/2021  8:03 AM CST -----  Please call patient and let her know that the cardiac event monitor showed some premature beats but no other arrhythmias (no atrial fibrillation). That is great news.

## 2021-11-22 NOTE — TELEPHONE ENCOUNTER
[facility]    Anali Silva BS, RN, SCRN  RN Stroke Neurology Care Coordinator  [facility] Neuroscience Service Line

## 2021-11-22 NOTE — TELEPHONE ENCOUNTER
Attempt #2. Call to patient. Left message asking patient to return the call to any triage nurse.  Vesta Huang RN

## 2021-11-23 ENCOUNTER — MYC MEDICAL ADVICE (OUTPATIENT)
Dept: FAMILY MEDICINE | Facility: CLINIC | Age: 51
End: 2021-11-23
Payer: COMMERCIAL

## 2021-11-23 NOTE — TELEPHONE ENCOUNTER
Sent ViaBill response informing, will monitor and close if pt confirms receipt  Barbara Youssef RN, BSN  Message handled by CLINIC NURSE.

## 2021-11-29 NOTE — TELEPHONE ENCOUNTER
Encounter closed, pt confirmed receipt  Barbara Youssef RN, BSN  Message handled by CLINIC NURSE.

## 2021-11-29 NOTE — TELEPHONE ENCOUNTER
See Mychart response, pt confirmed receipt  Barbara Youssef RN, BSN  Message handled by CLINIC NURSE.

## 2021-12-07 ENCOUNTER — MYC MEDICAL ADVICE (OUTPATIENT)
Dept: NEUROLOGY | Facility: CLINIC | Age: 51
End: 2021-12-07

## 2021-12-07 ENCOUNTER — VIRTUAL VISIT (OUTPATIENT)
Dept: NEUROLOGY | Facility: CLINIC | Age: 51
End: 2021-12-07
Attending: PHYSICIAN ASSISTANT
Payer: COMMERCIAL

## 2021-12-07 DIAGNOSIS — G45.9 TIA (TRANSIENT ISCHEMIC ATTACK): ICD-10-CM

## 2021-12-07 DIAGNOSIS — H53.2 DIPLOPIA: Primary | ICD-10-CM

## 2021-12-07 DIAGNOSIS — E78.5 HYPERLIPIDEMIA LDL GOAL <70: ICD-10-CM

## 2021-12-07 PROCEDURE — 99215 OFFICE O/P EST HI 40 MIN: CPT | Mod: 95 | Performed by: PHYSICIAN ASSISTANT

## 2021-12-07 NOTE — LETTER
12/7/2021         RE: Ericka Lantigua  [address] [facility] [address]        Dear Colleague,    Thank you for referring your patient, Ericka Lantigua, to the [facility] NEUROLOGY [facility]. Please see a copy of my visit note below.    Georgina is a 50 year old who is being evaluated via a billable video visit.      How would you like to obtain your AVS? MyChart  If the video visit is dropped, the invitation should be resent by: Send to e-mail at: [email or web address]  Will anyone else be joining your video visit? No      Video Start Time: 0806  Video-Visit Details    Type of service:  Video Visit    Video End Time:0844    Originating Location (pt. Location): Home    Distant Location (provider location):  [facility] NEUROLOGY [facility]     Platform used for Video Visit: [employer]         __________________________________________________________      [facility]    Virtual Clinic - [phone]  __________________________________________________________    Chief Complaint: Patient presents with:  Tia (Transient Ischemic Attack)      History of Present Illness: Ericka Lantigua is a 50 year old female presenting for follow-up for prior history of stroke with recent possible TIA.     Ericka presented in 3/2019 with vision changes and was found to have a cerebellar infarct, ESUS. Workup showed possible PFO but 30 day monitor was not done. She was on aspirin 81mg daily after this.    In 10/2021, she noticed that she couldn't see well upon waking up-- things were double, slanted. Lasted 5 minutes with breif improvement, then came back. BP in ER was 190s initially, and ultimately she was referred to us in TIA clinic for this-- MRI brain showed no new stroke, CTA no occlusion or stenosis, echo showed normal EF with some diastolic dysfunction and PFO, and 30 day cardionet monitor only showed PACs. She was treated with aspirin and Plavix for 3 weeks for  "that for possibility of TIA, although symptoms due to hypertension were in the differential as well.    After last visit, I referred her for mGLIDE trial (home BP monitoring program) but she did not end up connecting with the coordinator.      Since last visit, she notes that she has a mild intermittent ringing in ears that she didn't notice before.  Every other day.  More in L ear. Lasts only 15 seconds or so.       She also notes what she describes as delayed vision---almost daily feels like one eye has to catch up to the other. Almost a sensation of spinning like room is tilting, happens especially when she is turning her head such as when she is driving, and she has not been driving because of it (although went with her  a few times to test it out).    Regarding her BP, she kept a log and it's still high. This morning /79, last night 164/97. Highest on her log is 180/105.  She is workong on quitting smoking    Modified [place] Scale  Score: 1-No significant disability despite symptoms; able to carry out all usual duties and activities    Impression:     1. Cerebellar stroke in 2019, ESUS  - Recheck LDL within the next month, goal 40-70  - Continue aspirin 325mg daily  - Keep working on smoking cessation    2. Recent visual changes, ?diplopia  - Refer to neuroophthalmology for her ongoing visual complaints of feeling either a slight diplopia or like one eye is not \"catching up\" to the other. Unfortunately I am limited today in physical exam for this due to virtual visit  - Due to the recurrent nature and her benign neuroimaging suspect this is not vascular in origin and is either vestibular or ocular    3. Recent episode in 10/2021 of more acute diplopia  - Not entirely consistent with TIA, fairly nonfocal, could have been due to HTN    4. HTN  - Re-refer for mGLIDE trial-- if not a candidate, definitely needs treatment soon by PCP alone. BP consistently high  - Long term goal <135/80    5. PFO, ROPE " "score 4 (38% chance stroke in 2019 was due to PFO)  - Discussed with my attending Dr. Castellano. He recommends not pursing further workup or closure now, but rather focusing on the more likely causes of stroke, then HTN, HLD, smoking. Certainly if any recurrent ESUS were to occur with perfect control of risk factors, this would be a different story and closure could be considered.      - Follow up in 2 months  - Clinical trial screening: Rylee (interested)    Stroke Education provided.  She will call us with any questions.  For any acute neurologic deficits she was advised to  go directly to the hospital rather than call the clinic.    Allyson Fournier PA-C  Neurology  12/07/2021 8:10 AM  To page me or covering stroke neurology team member, click here: AMCOM  Choose \"On Call\" tab at top, then search dropdown box for \"Neurology Adult\" & press Enter, look for Neuro ICU/Stroke    ___________________________________________________________________    Current Medications  Current Outpatient Medications   Medication Sig     aspirin (ASA) 325 MG tablet Take 1 tablet (325 mg) by mouth daily     Bioflavonoid Products (VITAMIN C) CHEW      Coenzyme Q10 (CO Q 10 PO)      Ferrous Sulfate (IRON PO)      KRILL OIL PO      Methylcobalamin (B12-ACTIVE PO)      VITAMIN D, CHOLECALCIFEROL, PO Take by mouth daily     VITAMIN K PO      Zinc Acetate, Oral, (ZINC ACETATE PO)      clopidogrel (PLAVIX) 75 MG tablet Take 1 tablet (75 mg) by mouth daily     nicotine (NICODERM CQ) 14 MG/24HR 24 hr patch Place 1 patch onto the skin every 24 hours (Patient not taking: Reported on 12/7/2021)     nicotine (NICODERM CQ) 7 MG/24HR 24 hr patch Place 1 patch onto the skin every 24 hours (Patient not taking: Reported on 12/7/2021)     rosuvastatin (CRESTOR) 10 MG tablet Take 1 tablet (10 mg) by mouth daily (Patient not taking: Reported on 12/7/2021)     No current facility-administered medications for this visit.       Past Medical History  Past Medical " History:   Diagnosis Date     Anxiety      Cerebral infarction (H)        Social History  Social History     Tobacco Use     Smoking status: Current Every Day Smoker     Packs/day: 1.00     Types: Cigarettes     Smokeless tobacco: Never Used     Tobacco comment: Currently smoking about 3-4 cigarettes daily   Substance Use Topics     Alcohol use: Yes     Comment: daily, 1-2 drinks per day     Drug use: No       Family History  No family history on file.    Physical Exam    Vitals - Patient Reported 10/13/2021 12/7/2021   Weight (Patient Reported) 180 lb 170 lb   Systolic (Patient Reported) - 142   Diastolic (Patient Reported) - 79               General:  no acute distress  HEENT:  normocephalic/atraumatic  Pulmonary:  no respiratory distress    Neurologic  Mental Status:  alert, speech clear and fluent  Cranial Nerves:  EOMI with normal smooth pursuit, facial movements symmetric, hearing not formally tested but intact to conversation, no dysarthria,   Motor:  deferred  Reflexes:  unable to test (telestroke)  Sensory:  unable to test (telestroke)  Coordination:  deferred  Station/Gait:  unable to test (telestroke)    Neuroimaging: as per HPI. I personally reviewed those images    Labs:    Coagulation studies:  Recent Labs   Lab Test 10/11/21  0702 02/28/19  0930   INR 0.92 1.00        Lipid panel:  Recent Labs   Lab Test 10/11/21  0702 02/28/19  1847   CHOL 272* 187   HDL 47* 42*   * 115*   TRIG 175* 152*       HbA1C:  Recent Labs   Lab Test 10/11/21  0702 02/28/19  1847   A1C 5.7* 5.1       Troponin:  Recent Labs   Lab Test 07/09/20  1055 07/09/20  0846 02/28/19  2335   TROPI <0.015 <0.015 <0.015         Billing:    I spent a total of 55 minutes on the day of the visit.   Time spent doing chart review, history and exam, documentation and further activities per the note          Again, thank you for allowing me to participate in the care of your patient.        Sincerely,        Allyson Fournier PA-C

## 2021-12-07 NOTE — PROGRESS NOTES
Georgina is a 50 year old who is being evaluated via a billable video visit.      How would you like to obtain your AVS? MyChart  If the video visit is dropped, the invitation should be resent by: Send to e-mail at: [email or web address]  Will anyone else be joining your video visit? No      Video Start Time: 0806  Video-Visit Details    Type of service:  Video Visit    Video End Time:0844    Originating Location (pt. Location): Home    Distant Location (provider location):  [facility] NEUROLOGY [facility] [place]     Platform used for Video Visit: [employer]         __________________________________________________________      [facility]    Virtual Clinic - [phone]  __________________________________________________________    Chief Complaint: Patient presents with:  Tia (Transient Ischemic Attack)      History of Present Illness: Ericka Lantigua is a 50 year old female presenting for follow-up for prior history of stroke with recent possible TIA.     Ericka presented in 3/2019 with vision changes and was found to have a cerebellar infarct, ESUS. Workup showed possible PFO but 30 day monitor was not done. She was on aspirin 81mg daily after this.    In 10/2021, she noticed that she couldn't see well upon waking up-- things were double, slanted. Lasted 5 minutes with breif improvement, then came back. BP in ER was 190s initially, and ultimately she was referred to us in TIA clinic for this-- MRI brain showed no new stroke, CTA no occlusion or stenosis, echo showed normal EF with some diastolic dysfunction and PFO, and 30 day cardionet monitor only showed PACs. She was treated with aspirin and Plavix for 3 weeks for that for possibility of TIA, although symptoms due to hypertension were in the differential as well.    After last visit, I referred her for mGLIDE trial (home BP monitoring program) but she did not end up connecting with the coordinator.      Since last visit, she notes  "that she has a mild intermittent ringing in ears that she didn't notice before.  Every other day.  More in L ear. Lasts only 15 seconds or so.       She also notes what she describes as delayed vision---almost daily feels like one eye has to catch up to the other. Almost a sensation of spinning like room is tilting, happens especially when she is turning her head such as when she is driving, and she has not been driving because of it (although went with her  a few times to test it out).    Regarding her BP, she kept a log and it's still high. This morning /79, last night 164/97. Highest on her log is 180/105.  She is workong on quitting smoking    Modified [place] Scale  Score: 1-No significant disability despite symptoms; able to carry out all usual duties and activities    Impression:     1. Cerebellar stroke in 2019, ESUS  - Recheck LDL within the next month, goal 40-70  - Continue aspirin 325mg daily  - Keep working on smoking cessation    2. Recent visual changes, ?diplopia  - Refer to neuroophthalmology for her ongoing visual complaints of feeling either a slight diplopia or like one eye is not \"catching up\" to the other. Unfortunately I am limited today in physical exam for this due to virtual visit  - Due to the recurrent nature and her benign neuroimaging suspect this is not vascular in origin and is either vestibular or ocular    3. Recent episode in 10/2021 of more acute diplopia  - Not entirely consistent with TIA, fairly nonfocal, could have been due to HTN    4. HTN  - Re-refer for mGLIDE trial-- if not a candidate, definitely needs treatment soon by PCP alone. BP consistently high  - Long term goal <135/80    5. PFO, ROPE score 4 (38% chance stroke in 2019 was due to PFO)  - Discussed with my attending Dr. Castellano. He recommends not pursing further workup or closure now, but rather focusing on the more likely causes of stroke, then HTN, HLD, smoking. Certainly if any recurrent ESUS were to " "occur with perfect control of risk factors, this would be a different story and closure could be considered.      - Follow up in 2 months  - Clinical trial screening: Rylee (interested)    Stroke Education provided.  She will call us with any questions.  For any acute neurologic deficits she was advised to  go directly to the hospital rather than call the clinic.    Allyson Fournier PA-C  Neurology  12/07/2021 8:10 AM  To page me or covering stroke neurology team member, click here: AMCOM  Choose \"On Call\" tab at top, then search dropdown box for \"Neurology Adult\" & press Enter, look for Neuro ICU/Stroke    ___________________________________________________________________    Current Medications  Current Outpatient Medications   Medication Sig     aspirin (ASA) 325 MG tablet Take 1 tablet (325 mg) by mouth daily     Bioflavonoid Products (VITAMIN C) CHEW      Coenzyme Q10 (CO Q 10 PO)      Ferrous Sulfate (IRON PO)      KRILL OIL PO      Methylcobalamin (B12-ACTIVE PO)      VITAMIN D, CHOLECALCIFEROL, PO Take by mouth daily     VITAMIN K PO      Zinc Acetate, Oral, (ZINC ACETATE PO)      clopidogrel (PLAVIX) 75 MG tablet Take 1 tablet (75 mg) by mouth daily     nicotine (NICODERM CQ) 14 MG/24HR 24 hr patch Place 1 patch onto the skin every 24 hours (Patient not taking: Reported on 12/7/2021)     nicotine (NICODERM CQ) 7 MG/24HR 24 hr patch Place 1 patch onto the skin every 24 hours (Patient not taking: Reported on 12/7/2021)     rosuvastatin (CRESTOR) 10 MG tablet Take 1 tablet (10 mg) by mouth daily (Patient not taking: Reported on 12/7/2021)     No current facility-administered medications for this visit.       Past Medical History  Past Medical History:   Diagnosis Date     Anxiety      Cerebral infarction (H)        Social History  Social History     Tobacco Use     Smoking status: Current Every Day Smoker     Packs/day: 1.00     Types: Cigarettes     Smokeless tobacco: Never Used     Tobacco comment: " Currently smoking about 3-4 cigarettes daily   Substance Use Topics     Alcohol use: Yes     Comment: daily, 1-2 drinks per day     Drug use: No       Family History  No family history on file.    Physical Exam    Vitals - Patient Reported 10/13/2021 12/7/2021   Weight (Patient Reported) 180 lb 170 lb   Systolic (Patient Reported) - 142   Diastolic (Patient Reported) - 79               General:  no acute distress  HEENT:  normocephalic/atraumatic  Pulmonary:  no respiratory distress    Neurologic  Mental Status:  alert, speech clear and fluent  Cranial Nerves:  EOMI with normal smooth pursuit, facial movements symmetric, hearing not formally tested but intact to conversation, no dysarthria,   Motor:  deferred  Reflexes:  unable to test (telestroke)  Sensory:  unable to test (telestroke)  Coordination:  deferred  Station/Gait:  unable to test (telestroke)    Neuroimaging: as per HPI. I personally reviewed those images    Labs:    Coagulation studies:  Recent Labs   Lab Test 10/11/21  0702 02/28/19  0930   INR 0.92 1.00        Lipid panel:  Recent Labs   Lab Test 10/11/21  0702 02/28/19  1847   CHOL 272* 187   HDL 47* 42*   * 115*   TRIG 175* 152*       HbA1C:  Recent Labs   Lab Test 10/11/21  0702 02/28/19  1847   A1C 5.7* 5.1       Troponin:  Recent Labs   Lab Test 07/09/20  1055 07/09/20  0846 02/28/19  2335   TROPI <0.015 <0.015 <0.015         Billing:    I spent a total of 55 minutes on the day of the visit.   Time spent doing chart review, history and exam, documentation and further activities per the note

## 2021-12-15 ENCOUNTER — TELEPHONE (OUTPATIENT)
Dept: NEUROLOGY | Facility: CLINIC | Age: 51
End: 2021-12-15
Payer: COMMERCIAL

## 2021-12-15 NOTE — TELEPHONE ENCOUNTER
Referred to [facility] by SINA Fournier, made initial call to discuss study. No answer so left message with invitation to discuss study.

## 2021-12-16 NOTE — TELEPHONE ENCOUNTER
Spoke with participant about the study for about 30 minutes. Clarified any outstanding questions and she said that she wanted to think about it further. Pt stated that if she was interested in the future, she would reach out to enroll.

## 2021-12-20 SDOH — ECONOMIC STABILITY: INCOME INSECURITY: HOW HARD IS IT FOR YOU TO PAY FOR THE VERY BASICS LIKE FOOD, HOUSING, MEDICAL CARE, AND HEATING?: NOT VERY HARD

## 2021-12-20 SDOH — HEALTH STABILITY: PHYSICAL HEALTH: ON AVERAGE, HOW MANY MINUTES DO YOU ENGAGE IN EXERCISE AT THIS LEVEL?: 10 MIN

## 2021-12-20 SDOH — ECONOMIC STABILITY: FOOD INSECURITY: WITHIN THE PAST 12 MONTHS, THE FOOD YOU BOUGHT JUST DIDN'T LAST AND YOU DIDN'T HAVE MONEY TO GET MORE.: NEVER TRUE

## 2021-12-20 SDOH — HEALTH STABILITY: PHYSICAL HEALTH: ON AVERAGE, HOW MANY DAYS PER WEEK DO YOU ENGAGE IN MODERATE TO STRENUOUS EXERCISE (LIKE A BRISK WALK)?: 1 DAY

## 2021-12-20 SDOH — ECONOMIC STABILITY: FOOD INSECURITY: WITHIN THE PAST 12 MONTHS, YOU WORRIED THAT YOUR FOOD WOULD RUN OUT BEFORE YOU GOT MONEY TO BUY MORE.: NEVER TRUE

## 2021-12-20 SDOH — ECONOMIC STABILITY: INCOME INSECURITY: IN THE LAST 12 MONTHS, WAS THERE A TIME WHEN YOU WERE NOT ABLE TO PAY THE MORTGAGE OR RENT ON TIME?: NO

## 2021-12-20 SDOH — ECONOMIC STABILITY: TRANSPORTATION INSECURITY
IN THE PAST 12 MONTHS, HAS LACK OF TRANSPORTATION KEPT YOU FROM MEETINGS, WORK, OR FROM GETTING THINGS NEEDED FOR DAILY LIVING?: NO

## 2021-12-20 SDOH — ECONOMIC STABILITY: TRANSPORTATION INSECURITY
IN THE PAST 12 MONTHS, HAS THE LACK OF TRANSPORTATION KEPT YOU FROM MEDICAL APPOINTMENTS OR FROM GETTING MEDICATIONS?: NO

## 2021-12-20 ASSESSMENT — ENCOUNTER SYMPTOMS
COUGH: 0
HEMATURIA: 0
SHORTNESS OF BREATH: 0
DYSURIA: 0
BREAST MASS: 0
PARESTHESIAS: 0
NAUSEA: 0
SORE THROAT: 0
CHILLS: 0
WEAKNESS: 0
ABDOMINAL PAIN: 0
JOINT SWELLING: 0
DIZZINESS: 1
HEMATOCHEZIA: 0
CONSTIPATION: 1
ARTHRALGIAS: 0
PALPITATIONS: 0
HEARTBURN: 0
NERVOUS/ANXIOUS: 0
HEADACHES: 1
DIARRHEA: 0
MYALGIAS: 0
FEVER: 0
EYE PAIN: 0
FREQUENCY: 0

## 2021-12-20 ASSESSMENT — SOCIAL DETERMINANTS OF HEALTH (SDOH)
HOW OFTEN DO YOU GET TOGETHER WITH FRIENDS OR RELATIVES?: MORE THAN THREE TIMES A WEEK
IN A TYPICAL WEEK, HOW MANY TIMES DO YOU TALK ON THE PHONE WITH FAMILY, FRIENDS, OR NEIGHBORS?: MORE THAN THREE TIMES A WEEK
DO YOU BELONG TO ANY CLUBS OR ORGANIZATIONS SUCH AS CHURCH GROUPS UNIONS, FRATERNAL OR ATHLETIC GROUPS, OR SCHOOL GROUPS?: NO
HOW OFTEN DO YOU ATTEND CHURCH OR RELIGIOUS SERVICES?: PATIENT DECLINED

## 2021-12-20 ASSESSMENT — LIFESTYLE VARIABLES
HOW MANY STANDARD DRINKS CONTAINING ALCOHOL DO YOU HAVE ON A TYPICAL DAY: 1 OR 2
HOW OFTEN DO YOU HAVE SIX OR MORE DRINKS ON ONE OCCASION: NEVER
HOW OFTEN DO YOU HAVE A DRINK CONTAINING ALCOHOL: 2-3 TIMES A WEEK

## 2021-12-21 ENCOUNTER — OFFICE VISIT (OUTPATIENT)
Dept: FAMILY MEDICINE | Facility: CLINIC | Age: 51
End: 2021-12-21
Payer: COMMERCIAL

## 2021-12-21 VITALS
BODY MASS INDEX: 26.54 KG/M2 | HEART RATE: 84 BPM | HEIGHT: 68 IN | SYSTOLIC BLOOD PRESSURE: 142 MMHG | RESPIRATION RATE: 16 BRPM | OXYGEN SATURATION: 98 % | WEIGHT: 175.1 LBS | TEMPERATURE: 98.8 F | DIASTOLIC BLOOD PRESSURE: 92 MMHG

## 2021-12-21 DIAGNOSIS — Z00.00 ROUTINE GENERAL MEDICAL EXAMINATION AT A HEALTH CARE FACILITY: ICD-10-CM

## 2021-12-21 DIAGNOSIS — Z76.89 ENCOUNTER TO ESTABLISH CARE: ICD-10-CM

## 2021-12-21 DIAGNOSIS — I63.9 CEREBROVASCULAR ACCIDENT (CVA), UNSPECIFIED MECHANISM (H): Primary | ICD-10-CM

## 2021-12-21 DIAGNOSIS — E78.5 HYPERLIPIDEMIA LDL GOAL <100: ICD-10-CM

## 2021-12-21 DIAGNOSIS — Z71.6 TOBACCO ABUSE COUNSELING: ICD-10-CM

## 2021-12-21 DIAGNOSIS — Z12.11 SCREEN FOR COLON CANCER: ICD-10-CM

## 2021-12-21 DIAGNOSIS — R03.0 ELEVATED BLOOD PRESSURE READING WITHOUT DIAGNOSIS OF HYPERTENSION: ICD-10-CM

## 2021-12-21 DIAGNOSIS — R73.03 PREDIABETES: ICD-10-CM

## 2021-12-21 DIAGNOSIS — Z12.31 VISIT FOR SCREENING MAMMOGRAM: ICD-10-CM

## 2021-12-21 PROCEDURE — 90715 TDAP VACCINE 7 YRS/> IM: CPT | Performed by: NURSE PRACTITIONER

## 2021-12-21 PROCEDURE — 90472 IMMUNIZATION ADMIN EACH ADD: CPT | Performed by: NURSE PRACTITIONER

## 2021-12-21 PROCEDURE — 90750 HZV VACC RECOMBINANT IM: CPT | Performed by: NURSE PRACTITIONER

## 2021-12-21 PROCEDURE — 99396 PREV VISIT EST AGE 40-64: CPT | Mod: 25 | Performed by: NURSE PRACTITIONER

## 2021-12-21 PROCEDURE — 90471 IMMUNIZATION ADMIN: CPT | Performed by: NURSE PRACTITIONER

## 2021-12-21 ASSESSMENT — ENCOUNTER SYMPTOMS
DIZZINESS: 1
CONSTIPATION: 1
PARESTHESIAS: 0
HEMATURIA: 0
DIARRHEA: 0
WEAKNESS: 0
FREQUENCY: 0
ARTHRALGIAS: 0
ABDOMINAL PAIN: 0
JOINT SWELLING: 0
BREAST MASS: 0
NERVOUS/ANXIOUS: 0
FEVER: 0
PALPITATIONS: 0
HEADACHES: 1
SORE THROAT: 0
DYSURIA: 0
NAUSEA: 0
CHILLS: 0
EYE PAIN: 0
HEMATOCHEZIA: 0
MYALGIAS: 0
COUGH: 0
SHORTNESS OF BREATH: 0
HEARTBURN: 0

## 2021-12-21 ASSESSMENT — MIFFLIN-ST. JEOR: SCORE: 1443.78

## 2021-12-21 NOTE — PATIENT INSTRUCTIONS
Preventive Health Recommendations  Female Ages 50 - 64    Yearly exam: See your health care provider every year in order to  o Review health changes.   o Discuss preventive care.    o Review your medicines if your doctor has prescribed any.      Get a Pap test every three years (unless you have an abnormal result and your provider advises testing more often).    If you get Pap tests with HPV test, you only need to test every 5 years, unless you have an abnormal result.     You do not need a Pap test if your uterus was removed (hysterectomy) and you have not had cancer.    You should be tested each year for STDs (sexually transmitted diseases) if you're at risk.     Have a mammogram every 1 to 2 years.    Have a colonoscopy at age 50, or have a yearly FIT test (stool test). These exams screen for colon cancer.      Have a cholesterol test every 5 years, or more often if advised.    Have a diabetes test (fasting glucose) every three years. If you are at risk for diabetes, you should have this test more often.     If you are at risk for osteoporosis (brittle bone disease), think about having a bone density scan (DEXA).    Shots: Get a flu shot each year. Get a tetanus shot every 10 years.    Nutrition:     Eat at least 5 servings of fruits and vegetables each day.    Eat whole-grain bread, whole-wheat pasta and brown rice instead of white grains and rice.    Get adequate Calcium and Vitamin D.     Lifestyle    Exercise at least 150 minutes a week (30 minutes a day, 5 days a week). This will help you control your weight and prevent disease.    Limit alcohol to one drink per day.    No smoking.     Wear sunscreen to prevent skin cancer.     See your dentist every six months for an exam and cleaning.    See your eye doctor every 1 to 2 years.  Patient Education     Pneumococcal Vaccine  Pneumococcal vaccines are the best way to prevent pneumococcal disease. They are safe and effective. The vaccines are given as shots  (injections). This can be done at your healthcare provider's office or a health clinic. Pharmacies, senior centers, and workplaces often offer vaccinations, too. Your healthcare provider can help you know which vaccine is best for you.   Two types of vaccines protect against pneumococcal disease:     PCV13 (pneumococcal conjugate vaccine)    PPSV23 (pneumococcal polysaccharide vaccine)  What is pneumococcal disease?  Pneumococcal disease is caused by bacteria (Streptococcus pneumoniae). This germ is easily spread when someone with the bacteria coughs, sneezes, laughs, or talks. You can get pneumococcal disease more than once. This is because there are many different types (strains) of the bacteria. Some strains are also resistant to treatment with antibiotics.   There are different kinds of pneumococcal disease, depending on what part of the body is infected. They include:     Pneumonia. Infection in the lungs.    Meningitis. Infection of the covering of the brain and spinal cord.    Otitis media. Infection of the middle ear.    Bacteremia or septicemia. Infection in the blood.  Pneumococcal disease can be life-threatening, especially for people in high-risk groups. Each year, thousands of people die from this disease. Thousands more become seriously ill.   Who should get the vaccine?    The pneumococcal vaccine is advised for young children, older adults, and certain other people.   PCV13 is recommended for:     All children younger than 2 years old. A 4-dose series of PCV13 is recommended at 2, 4, 6, and 12 to 15 months of age.    People age 2 years and older with certain health conditions  Healthy people 65 years and older who have not gotten PCV13 before may also get it after talking with their healthcare provider.   PPSV23 is recommended for:     All adults 65 years and older    People ages 2 through 64 years old with certain health conditions    Adults 19 years and older who smoke cigarettes  Some people need  multiple or booster doses of pneumococcal vaccine. Talk with your provider about your situation and which vaccine is advised.   [employer] last reviewed this educational content on 11/1/2019 2000-2021 [employer]. All rights reserved. This information is not intended as a substitute for professional medical care. Always follow your healthcare professional's instructions.

## 2021-12-21 NOTE — PROGRESS NOTES
SUBJECTIVE:   CC: Ericka Lantigua is an 51 year old woman who presents for preventive health visit.     Patient has been advised of split billing requirements and indicates understanding: Yes  Healthy Habits:     Getting at least 3 servings of Calcium per day:  NO    Bi-annual eye exam:  Yes    Dental care twice a year:  NO    Sleep apnea or symptoms of sleep apnea:  Daytime drowsiness and Excessive snoring    Diet:  Regular (no restrictions)    Frequency of exercise:  1 day/week    Duration of exercise:  Less than 15 minutes    Taking medications regularly:  Yes    Medication side effects:  None    PHQ-2 Total Score: 0    Additional concerns today:  No    Is working with Neurology team to monitor blood pressure. Has been borderline for years.   History of CVA 2019.  10/2021 with possible TIA as visual changes. Will be working with Neuro-Optho at [facility].   Plavix therapy completed. Continues with  mg.  Not taking statin. Does not want to have side effects. No previous side effects.     Little exercise exercise planned. Working to improve diet and exercise.     No longer having periods. , no new partners.     Daily tobacco use, desires no medication for cessation. Is working on it.     Today's PHQ-2 Score:   PHQ-2 ( 1999 Pfizer) 12/20/2021   Q1: Little interest or pleasure in doing things 0   Q2: Feeling down, depressed or hopeless 0   PHQ-2 Score 0   PHQ-2 Total Score (12-17 Years)- Positive if 3 or more points; Administer PHQ-A if positive -   Q1: Little interest or pleasure in doing things Not at all   Q2: Feeling down, depressed or hopeless Not at all   PHQ-2 Score 0        Abuse: Current or Past (Physical, Sexual or Emotional) - No  Do you feel safe in your environment? Yes    Have you ever done Advance Care Planning? (For example, a Health Directive, POLST, or a discussion with a medical provider or your loved ones about your wishes): No, advance care planning information given to patient  to review.  Patient declined advance care planning discussion at this time.    Social History     Tobacco Use     Smoking status: Current Every Day Smoker     Packs/day: 4.00     Years: 10.00     Pack years: 40.00     Types: Cigarettes     Smokeless tobacco: Never Used     Tobacco comment: Currently smoking about 3-4 cigarettes daily   Substance Use Topics     Alcohol use: Yes     Comment: 1-2 glasses of wine per week     If you drink alcohol do you typically have >3 drinks per day or >7 drinks per week? No    Alcohol Use 12/21/2021   Prescreen: >3 drinks/day or >7 drinks/week? -   Prescreen: >3 drinks/day or >7 drinks/week? No     Reviewed orders with patient.  Reviewed health maintenance and updated orders accordingly - Yes  Lab work is in process  Labs reviewed in EPIC    Breast Cancer Screening:    Breast CA Risk Assessment (FHS-7) 12/20/2021   Do you have a family history of breast, colon, or ovarian cancer? No / Unknown         Mammogram Screening: Recommended annual mammography  Pertinent mammograms are reviewed under the imaging tab.    History of abnormal Pap smear: none known.      Reviewed and updated as needed this visit by clinical staff  Tobacco  Allergies  Meds  Problems  Med Hx  Surg Hx  Fam Hx  Soc Hx         Reviewed and updated as needed this visit by Provider  Tobacco  Allergies  Meds  Problems  Med Hx  Surg Hx  Fam Hx        Past Medical History:   Diagnosis Date     Anxiety      Cerebral infarction (H)       History reviewed. No pertinent surgical history.    Review of Systems   Constitutional: Negative for chills and fever.   HENT: Positive for ear pain. Negative for congestion, hearing loss and sore throat.    Eyes: Positive for visual disturbance. Negative for pain.   Respiratory: Negative for cough and shortness of breath.    Cardiovascular: Negative for chest pain, palpitations and peripheral edema.   Gastrointestinal: Positive for constipation. Negative for abdominal pain,  "diarrhea, heartburn, hematochezia and nausea.   Breasts:  Negative for tenderness, breast mass and discharge.   Genitourinary: Negative for dysuria, frequency, genital sores, hematuria, pelvic pain, urgency, vaginal bleeding and vaginal discharge.   Musculoskeletal: Negative for arthralgias, joint swelling and myalgias.   Skin: Negative for rash.   Neurological: Positive for dizziness and headaches. Negative for weakness and paresthesias.   Psychiatric/Behavioral: Negative for mood changes. The patient is not nervous/anxious.           OBJECTIVE:   BP (!) 142/92 (BP Location: Left arm, Patient Position: Sitting, Cuff Size: Adult Large)   Pulse 84   Temp 98.8  F (37.1  C) (Oral)   Resp 16   Ht 1.721 m (5' 7.75\")   Wt 79.4 kg (175 lb 1.6 oz)   SpO2 98%   BMI 26.82 kg/m    Physical Exam  GENERAL: healthy, alert and no distress  EYES: Eyes grossly normal to inspection, PERRL and conjunctivae and sclerae normal  HENT: ear canals and TM's normal, nose and mouth without ulcers or lesions  NECK: no adenopathy, no asymmetry, masses, or scars and thyroid normal to palpation  RESP: lungs clear to auscultation - no rales, rhonchi or wheezes  CV: regular rate and rhythm, normal S1 S2, no S3 or S4, no murmur, click or rub, no peripheral edema and peripheral pulses strong  ABDOMEN: soft, nontender, no hepatosplenomegaly, no masses and bowel sounds normal  MS: no gross musculoskeletal defects noted, no edema  SKIN: no suspicious lesions or rashes  NEURO: Normal strength and tone, mentation intact and speech normal  PSYCH: mentation appears normal, affect normal/bright    Diagnostic Test Results:  Labs reviewed in Epic    ASSESSMENT/PLAN:   Ericka was seen today for physical.    Diagnoses and all orders for this visit:    Cerebrovascular accident (CVA), unspecified mechanism (H)  -     STATIN NOT PRESCRIBED (INTENTIONAL); Please choose reason not prescribed from choices below.  Follows with Neurology and Neuro-Ophthalmology. " "Plavix therapy completed, continues with  mg. Patient stopped statin as she did not want to chance side effects. Discussed benefits of statin therapy and continues to decline.     Routine general medical examination at a health care facility  Establish care new patient.   Declines pap smear today, no abnormal history.   Declines HIV and hepatitis C screening.   Declines influenza, COVID-19, and PCV23 vaccines despite education.     Tobacco abuse counseling  Declines pharmacotherapies, cessation per her own cutting down.     Prediabetes  -     AMB Adult Diabetes Educator Referral; Future    Elevated blood pressure reading without diagnosis of hypertension  Working with Neurology on potential trial for control.     Hyperlipidemia LDL goal <100  Recommended exercise and high fiber diet. Continue to recommend statin.     Visit for screening mammogram  -     Mammo Screening digital (bilat); Future    Screen for colon cancer  -     Fecal colorectal cancer screen (FIT); Future  -     Fecal colorectal cancer screen (FIT)    Encounter to establish care    Other orders  -     REVIEW OF HEALTH MAINTENANCE PROTOCOL ORDERS  -     TDAP VACCINE (Adacel, Boostrix)  [[id number]]  -     ZOSTER VACCINE RECOMBINANT ADJUVANTED IM NJX        Patient has been advised of split billing requirements and indicates understanding: Yes  COUNSELING:  Reviewed preventive health counseling, as reflected in patient instructions       Regular exercise       Healthy diet/nutrition       Aspirin prophylaxis       Osteoporosis prevention/bone health       Colon cancer screening       Consider Hep C screening for all patients one time for ages 18-79 years       HIV screeninx in teen years, 1x in adult years, and at intervals if high risk    Estimated body mass index is 26.82 kg/m  as calculated from the following:    Height as of this encounter: 1.721 m (5' 7.75\").    Weight as of this encounter: 79.4 kg (175 lb 1.6 oz).    Weight management " plan: Discussed healthy diet and exercise guidelines    She reports that she has been smoking cigarettes. She has a 40.00 pack-year smoking history. She has never used smokeless tobacco.  Tobacco Cessation Action Plan:   Self help information given to patient      Counseling Resources:  ATP IV Guidelines  Pooled Cohorts Equation Calculator  Breast Cancer Risk Calculator  BRCA-Related Cancer Risk Assessment: FHS-7 Tool  FRAX Risk Assessment  ICSI Preventive Guidelines  Dietary Guidelines for Americans, 2010  USDA's MyPlate  ASA Prophylaxis  Lung CA Screening    DANIELLE Castillo [facility]

## 2021-12-24 ENCOUNTER — TELEPHONE (OUTPATIENT)
Dept: FAMILY MEDICINE | Facility: CLINIC | Age: 51
End: 2021-12-24
Payer: COMMERCIAL

## 2021-12-24 NOTE — TELEPHONE ENCOUNTER
Diabetes Education Scheduling Outreach #1:    Call to patient to schedule. Left message with phone number to call to schedule.    Plan for 2nd outreach attempt within 1 week.    Vesta Smith  [place] [facility]  Diabetes and Nutrition Scheduling

## 2022-01-02 NOTE — TELEPHONE ENCOUNTER
Diabetes Education Scheduling Outreach #2:    NoteWagonhart message to patient requesting to call to schedule.    [facility]  Diabetes and Nutrition Scheduling

## 2022-01-03 ENCOUNTER — E-VISIT (OUTPATIENT)
Dept: URGENT CARE | Facility: CLINIC | Age: 52
End: 2022-01-03
Payer: COMMERCIAL

## 2022-01-03 DIAGNOSIS — Z20.822 CLOSE EXPOSURE TO 2019 NOVEL CORONAVIRUS: Primary | ICD-10-CM

## 2022-01-03 PROCEDURE — 99421 OL DIG E/M SVC 5-10 MIN: CPT | Performed by: PHYSICIAN ASSISTANT

## 2022-01-03 NOTE — PATIENT INSTRUCTIONS
Dear Georgina,    Based on your exposure to COVID-19 (coronavirus), we would like to test you for this virus. I have placed an order for this test. The best time for testing is 5-7 days after the exposure.    How to schedule:  Go to your [facility] home page and scroll down to the section that says  You have an appointment that needs to be scheduled  and click the large green button that says  Schedule Now  and follow the steps to find the next available opening.     If you are unable to complete these [facility] scheduling steps, please call [phone] to schedule your testing.     Monoclonal antibody treatment after exposure:  Because you have been exposed to COVID-19, you may be able to receive a treatment with monoclonal antibodies. This treatment can lower your risk of severe illness and going to the hospital. It is given through an IV or under your skin (subcutaneous) and must be given at an infusion center.   To be eligible, you must be 12 years or older, at least 88 pounds and:    Are not fully vaccinated against COVID-19, OR    Are immunocompromised     If you would like to sign up to be considered to receive the monoclonal antibody medicine, please complete a participation form through the [facility] here:  MNRAP ([email or web address]). You may also call the [facility] COVID-19 Public Hotline at 1-259.233.7834 (open Mon-Fri: 9am-7pm and Sat: 10am-6pm).     Not all people who are eligible will receive the medicine since supply is limited. You will be contacted in the next 1 to 2 business days only if you are selected. If you do not receive a call, you have not been selected to receive the medicine. If you have any questions about this medication, please contact your primary care provider. For more information, see [email or web address]    Return to work/school/ guidance:   Please let your workplace manager and  staffing office know when your quarantine ends. We cannot give you an exact date as it depends on the information below. You can calculate this on your own or work with your manager/staffing office to calculate this.    Quarantine Guidelines:  You are considered exposed if you have been within 6 feet of an infected person(s) for 15 minutes or more over a 24-hour period. Quarantine will start after the LAST time you had contact with the infected person while they were contagious (for example, if you saw someone on Monday and Wednesday, your quarantine would start after Wednesday).     If you have NO symptoms (asymptomatic):    Stay home for 14 days (quarantine) after your LAST contact with a person who has COVID-19 (this remains the CDC recommendation for greatest protection against spread of COVID-19), OR    10-day quarantine with no test, OR    Minimum 7-day quarantine with negative RT-PCR test collected on day 5 or later    Quarantine Guideline exceptions:    People who have been fully vaccinated do not need to quarantine unless they have symptoms. You are considered fully vaccinated 2 weeks after your 2nd dose in a 2-dose series or 2 weeks after a single-dose series. This includes vaccinated health care workers.  o Fully vaccinated people should still get tested 5-7 days after exposure, even if they don t have symptoms.   Note: If you have ongoing exposure to the COVID-positive person, this quarantine period may be more than 14 days. For example, if you continue to be exposed to your child who tested positive and cannot isolate from them, then the quarantine of 7-14 days can't start until your child is no longer contagious. This is typically 10 days from onset of the child's symptoms. So, the total duration may be 17-24 days in this case.   Please contact your doctor if you have questions or call the [facility] Public Hotline: 1-817.524.8901 (Mon-Fri: 9am-7pm and Sat: 10am-6pm).     How to Quarantine:     Monitor your  symptoms until 14 days after your last exposure. If you develop signs or symptoms, isolate and get tested (even if you have been tested already).    Stay home and away from others. Don't go to school or anywhere else. Generally, quarantine means staying home from work but there are some exceptions to this. Please contact your workplace. Cover your mouth and nose with a face covering if you must be around other people.     Wash your hands and face often. Use soap and water.    What are the symptoms of COVID-19?  The most common symptoms are cough, fever and trouble breathing. Less common symptoms include headache, body aches, fatigue (feeling very tired), chills, sore throat, stuffy or runny nose, diarrhea (loose poop), loss of taste or smell, belly pain, and nausea or vomiting (feeling sick to your stomach or throwing up).  If you develop symptoms, follow these guidelines:    If you're normally healthy: Please start another eVisit.    If you have a serious health problem (like cancer, heart failure, an organ transplant or kidney disease): Call your specialty clinic. Let them know that you might have COVID-19.    Where can I get more information?    [facility] - About COVID-19: [email or web address]     CDC - What to Do If You're Sick:     www.cdc.gov/coronavirus/2019-ncov/about/steps-when-sick.html    CDC - Ending Home Isolation:  https://www.cdc.gov/coronavirus/2019-ncov/your-health/quarantine-isolation.html    CDC - Caring for Someone:  www.cdc.gov/coronavirus/2019-ncov/if-you-are-sick/care-for-someone.html    [facility] clinical trials (COVID-19 research studies): clinicalaffairs.[facility]/umn-clinical-trials    Below are the COVID-19 hotlines at the [facility] Health ([facility]). Interpreters are available.  o For health questions: Call 741-398-4124 or 1-899.372.8942 (7 am to 7 pm)  o For questions about schools and childcare: Call 595-266-5985 or 1-227.944.9312 (7 a.m. to 7  "p.m.)    January 3, 2022  RE:  Ericka Lantigua                                                                                                                   [address] [place] [address] [place] [address]      To whom it may concern:    I evaluated Ericka Lantigua on January 3, 2022. Ericka Lantigua should be excused from work/school.    They should let their workplace manager and staffing office know when their quarantine ends.    We can not give an exact date as it depends on the information below. They can calculate this on their own or work with their manager/staffing office to calculate this. (For example if they were exposed on 10/04, they would have to quarantine for 14 full days. That would be through 10/18. They could return on 10/19.)    Quarantine Guidelines:    Patients (\"contacts\") who have been in close prolonged contact of an infected person(s) (within six feet for at least 15 minutes within a 24 hour period) and remain asymptomatic should enter quarantine based on the following options:      14-day quarantine period (this remains the CDC recommendation for the greatest protection against spread of COVID-19) OR    Minimum 7-day quarantine with negative RT-PCR test collected on day 5 or later OR    10-day quarantine with no test   Quarantine Guideline exceptions are as follows:    People who have been fully vaccinated do not need to quarantine if the exposure was at least 2 weeks after the last vaccination. This includes vaccinated health care workers.    Not fully vaccinated and unvaccinated Individuals who work in health care, congregate care, or congregate living should be off work for 14 days from their last date of exposure. Community activities for this group can be resumed based on options above. Fully vaccinated individuals in this group do not need to quarantine from work after exposure.    Not fully vaccinated and unvaccinated people whose high-risk exposure was a household " member should always quarantine for 14 days from their last date of exposure. Fully vaccinated people in this category do not need to quarantine.    Not fully vaccinated or unvaccinated residents of congregate care and congregate living settings should always quarantine for 14 days from their last date of exposure. Fully vaccinated residents do not need to quarantine.    Note: If there is ongoing exposure to the covid positive person, this quarantine period may be longer than 14 days. (For example, if they are continually exposed to their child, who tested positive and cannot isolate from them, then the quarantine of 7-14 days can't start until their child is no longer contagious. This is typically 10 days from onset to the child's symptoms. So the total duration may be 17-24 days in this case.)    Ericka YING Lantigua should continue symptom monitoring until day 14 post-exposure. If they develop signs or symptoms of COVID-19, they should isolate and get tested (even if they have been tested already).    Sincerely,  Rod Salas PA-C

## 2022-01-26 ENCOUNTER — TELEPHONE (OUTPATIENT)
Dept: NEUROLOGY | Facility: CLINIC | Age: 52
End: 2022-01-26
Payer: COMMERCIAL

## 2022-01-26 NOTE — LETTER
Ericka Lantigua,    Thank you for choosing [facility] for your care.  Our records indicate you are due for a Virtual follow up with one of our Stroke/Neurology Advanced practice providers.     If you have any questions or need help with scheduling, call us at [phone]    Yours in [facility] Team

## 2022-02-09 NOTE — TELEPHONE ENCOUNTER
3rd attempt. Mychart and letter mailed to patient.  FYI to provider pool.  PAMELA JIMÉNEZ, BENJAMIN

## 2022-02-10 ENCOUNTER — OFFICE VISIT (OUTPATIENT)
Dept: OPHTHALMOLOGY | Facility: CLINIC | Age: 52
End: 2022-02-10
Attending: PHYSICIAN ASSISTANT
Payer: COMMERCIAL

## 2022-02-10 DIAGNOSIS — H53.10 SUBJECTIVE VISUAL DISTURBANCE: Primary | ICD-10-CM

## 2022-02-10 DIAGNOSIS — H53.10 SUBJECTIVE VISUAL DISTURBANCE: ICD-10-CM

## 2022-02-10 DIAGNOSIS — H53.2 DIPLOPIA: ICD-10-CM

## 2022-02-10 DIAGNOSIS — H50.52 EXOPHORIA: Primary | ICD-10-CM

## 2022-02-10 PROCEDURE — 92004 COMPRE OPH EXAM NEW PT 1/>: CPT | Mod: GC | Performed by: OPHTHALMOLOGY

## 2022-02-10 PROCEDURE — G0463 HOSPITAL OUTPT CLINIC VISIT: HCPCS | Mod: 25

## 2022-02-10 PROCEDURE — 92060 SENSORIMOTOR EXAMINATION: CPT | Performed by: OPHTHALMOLOGY

## 2022-02-10 ASSESSMENT — EXTERNAL EXAM - RIGHT EYE: OD_EXAM: NORMAL

## 2022-02-10 ASSESSMENT — VISUAL ACUITY
OD_CC: 20/20
OS_CC: 20/20
METHOD: SNELLEN - LINEAR
OS_CC+: -2
CORRECTION_TYPE: CONTACTS

## 2022-02-10 ASSESSMENT — REFRACTION_WEARINGRX
OS_SPHERE: -3.25
OD_AXIS: 097
OD_CYLINDER: +0.25
OS_CYLINDER: +0.50
OD_SPHERE: -2.75
OS_AXIS: 094
OS_ADD: +2.75
OD_ADD: +2.75
SPECS_TYPE: PAL

## 2022-02-10 ASSESSMENT — CUP TO DISC RATIO
OD_RATIO: 0.2
OS_RATIO: 0.2

## 2022-02-10 ASSESSMENT — CONF VISUAL FIELD
OD_NORMAL: 1
OS_NORMAL: 1

## 2022-02-10 ASSESSMENT — SLIT LAMP EXAM - LIDS
COMMENTS: NORMAL
COMMENTS: NORMAL

## 2022-02-10 ASSESSMENT — TONOMETRY
IOP_METHOD: ICARE
OD_IOP_MMHG: 15
OS_IOP_MMHG: 16

## 2022-02-10 ASSESSMENT — EXTERNAL EXAM - LEFT EYE: OS_EXAM: NORMAL

## 2022-02-10 NOTE — NURSING NOTE
"Chief Complaints and History of Present Illnesses   Patient presents with     Blurred Vision Evaluation     Diplopia Evaluation     Chief Complaint(s) and History of Present Illness(es)     Blurred Vision Evaluation               Diplopia Evaluation               Comments     Ericka Lantigua is a 51 year old female who presents today for    1. Cerebellar stroke in 2019, ESUS     2. Recent visual changes, ?diplopia  Images \"shift\". No diplopia. Sometimes feels like her eyes jump to the left. Feels her eyes need period to readjust.   Intermittent and subsiding. Every two weeks, but not with any consistency. Each episode lasts a few seconds.    3. Recent episode in 10/2021 of more acute diplopia      Brant FORBES 7:37 AM February 10, 2022                   "

## 2022-02-10 NOTE — PROGRESS NOTES
"     Assessment & Plan     Ericka Lantigua is a 51 year old female with the following diagnoses:   1. Diplopia         Patient was sent for consultation by Dr. Allyson Fournier for diplopia.     HPI:  Patient has a history of cerebellar infarct 3/2019, had awoken with \"couldn't see; dark in both eyes.\" Lasted less than a minute. Thinks she also may have very transient weakness on the LUE. Work up showed possible PFO.     In 10/2021, woke up with intermittent binocular diagonal double vision, which lasted at least an hour. Had an unremarkable MRI and MRA brain and neck w/ and w/o contrast at that time. Thus, was diagnosed with a possible TIA. Not stress related.   Now she has a new symptom.  She is experiencing episodes where everything in the world seems to tilt between 45 and 90 degrees and then returns back to being upright.  This lasts about 15 seconds or so.  It is happening once every couple weeks now. Last episode was over a week ago. Lasts around 1-5 seconds now. Happens more often when looking at distance.     Patient denies any noticeable change in appearance of eyes, periorbita, and lids. No blurry vision, flashes, floaters, redness, or discharge.   No headaches, no jaw claudication, unintentional weight loss, fevers, chills    ROS otherwise negative except for the above.     Independent historians:  Patient    Review of outside testing:  MRI and MRA brain and neck w/ and w/o contrast 10/11/2021  Recent labs 10/11/21    My interpretation performed today of outside testing:  Normal, no acute infarction or inflammation, and no evidence of extraocular enlargement  10/11/21 TSH 4.66, T4 free 0.94 -- subclinical     Review of outside clinical notes:  Dr. Allyson Fournier clinic notes     Past medical history:  History of cerebellar infarct 3/2019  Possible PFO  Anxiety  No hypertension or diabetes or thyroid problems.     Medications:   aspirin  B12-ACTIVE PO  CO Q 10 PO  IRON PO  KRILL OIL PO  STATIN NOT " PRESCRIBED  Vitamin C Chew  VITAMIN D (CHOLECALCIFEROL) PO  VITAMIN K PO  ZINC ACETATE PO    Family history / social history:  No ocular history.   Mother with atrial fib.    Works from home in [place]. On the computer often.     Current smoker, 4-5 cigs a day. Occasional alcohol. No illicit drug.     Exam:  Visual acuity 20/20 right eye 20/20-2 left eye.  Color vision 11/11 right eye and 11/11 left eye. Pupils brisk without relative afferent pupillary defect.  Intraocular pressure 15 right eye and 16 left eye.  Anterior segment exam was unremarkable.  Fundus exam showed healthy appearance of the optic nerves and otherwise unremarkable.  Strabismus exam trace exophoria at distance and trace esophoria at near. Extraocular movements full.     Discussion of management / interpretation with another provider:   None    Assessment/Plan:   It is my impression that she has a history of a cerebellar infarction.  She had an episode of double vision lasting 1 hour in October.  This was likely some type of transient ischemic attack.  She is being evaluated for this.  The episodes of blurring of her vision are unlikely to be transient ischemic attacks.  I am not quite sure what they are but they are too short to be TIAs.  I have asked her to try to cover each eye independently when it occurs.  We discussed that this sensation of blurred vision which lasts seconds at a time could be due to dry eye.  She will try putting artificial tears in her eye to see if this makes it better.    I     Maria Dolores Peralta MD  PGY-3 Resident Physician  Department of Ophthalmology     Attending Physician Attestation:  Complete documentation of historical and exam elements from today's encounter can be found in the full encounter summary report (not reduplicated in this progress note).  I personally obtained the chief complaint(s) and history of present illness.  I confirmed and edited as necessary the review of systems, past medical/surgical history, family  history, social history, and examination findings as documented by others; and I examined the patient myself.  I personally reviewed the relevant tests, images, and reports as documented above.  I formulated and edited as necessary the assessment and plan and discussed the findings and management plan with the patient and family. I personally reviewed the ophthalmic test(s) associated with this encounter, agree with the interpretation(s) as documented by the resident/fellow, and have edited the corresponding report(s) as necessary.  - Armond Smith MD

## 2022-02-10 NOTE — LETTER
"2/10/2022         RE:  :  MRN: Ericka Lantigua  1970  [id number]     Dear Dr. Allyson Fournier,    Thank you for asking me to see your very pleasant patient, Ericka Lantigua, in neuro-ophthalmic consultation.  I would like to thank you for sending your records and I have summarized them in the history of present illness. My assessment and plan are below.  For further details, please see my attached clinic note.      Assessment & Plan     Ericka Lantigua is a 51 year old female with the following diagnoses:   1. Diplopia         Patient was sent for consultation by Dr. Allyson Fournier for diplopia.     HPI:  Patient has a history of cerebellar infarct 3/2019, had awoken with \"couldn't see; dark in both eyes.\" Lasted less than a minute. Thinks she also may have very transient weakness on the LUE. Work up showed possible PFO.     In 10/2021, woke up with intermittent binocular diagonal double vision, which lasted at least an hour. Had an unremarkable MRI and MRA brain and neck w/ and w/o contrast at that time. Thus, was diagnosed with a possible TIA. Not stress related.   Now she has a new symptom.  She is experiencing episodes where everything in the world seems to tilt between 45 and 90 degrees and then returns back to being upright.  This lasts about 15 seconds or so.  It is happening once every couple weeks now. Last episode was over a week ago. Lasts around 1-5 seconds now. Happens more often when looking at distance.     Patient denies any noticeable change in appearance of eyes, periorbita, and lids. No blurry vision, flashes, floaters, redness, or discharge.   No headaches, no jaw claudication, unintentional weight loss, fevers, chills    ROS otherwise negative except for the above.       Independent historians:  Patient    Review of outside testing:  MRI and MRA brain and neck w/ and w/o contrast 10/11/2021  Recent labs 10/11/21    My interpretation performed today of outside testing:  Normal, " no acute infarction or inflammation, and no evidence of extraocular enlargement  10/11/21 TSH 4.66, T4 free 0.94 -- subclinical     Review of outside clinical notes:  Dr. Allyson Fournier clinic notes     Past medical history:  History of cerebellar infarct 3/2019  Possible PFO  Anxiety  No hypertension or diabetes or thyroid problems.     Medications:   aspirin  B12-ACTIVE PO  CO Q 10 PO  IRON PO  KRILL OIL PO  STATIN NOT PRESCRIBED  Vitamin C Chew  VITAMIN D (CHOLECALCIFEROL) PO  VITAMIN K PO  ZINC ACETATE PO    Family history / social history:  No ocular history.   Mother with atrial fib.    Works from home in [place]. On the computer often.     Current smoker, 4-5 cigs a day. Occasional alcohol. No illicit drug.     Exam:  Visual acuity 20/20 right eye 20/20-2 left eye.  Color vision 11/11 right eye and 11/11 left eye. Pupils brisk without relative afferent pupillary defect.  Intraocular pressure 15 right eye and 16 left eye.  Anterior segment exam was unremarkable.  Fundus exam showed healthy appearance of the optic nerves and otherwise unremarkable.  Strabismus exam trace exophoria at distance and trace esophoria at near. Extraocular movements full.     Discussion of management / interpretation with another provider:   None    Assessment/Plan:   It is my impression that she has a history of a cerebellar infarction.  She had an episode of double vision lasting 1 hour in October.  This was likely some type of transient ischemic attack.  She is being evaluated for this.  The episodes of blurring of her vision are unlikely to be transient ischemic attacks.  I am not quite sure what they are but they are too short to be TIAs.  I have asked her to try to cover each eye independently when it occurs.  We discussed that this sensation of blurred vision which lasts seconds at a time could be due to dry eye.  She will try putting artificial tears in her eye to see if this makes it better.    Again, thank you for allowing me to  participate in the care of your patient.      Sincerely,    Armond Smith MD  Professor  Ophthalmology Residency   Director of Neuro-Ophthalmology  [employer] [facility] [employer] Endowed Chair  Departments of Ophthalmology, Neurology, and Neurosurgery  [facility] [address] [place]  [address]  T - [phone]  F - [phone]  [email or web address]    CC:   DANIELLE Castillo CNP  [address] [facility] [address]  Via In Basket     Allyson Fournier PA-C  [address] [facility] [address]  Via In Basket

## 2022-02-10 NOTE — Clinical Note
"2/10/2022       RE: Ericka Lantigua  [address] [facility] [place] [address]     Dear Colleague,    Thank you for referring your patient, Ericka Lantigua, to the [facility] [place] at [facility]. Please see a copy of my visit note below.         Assessment & Plan     Ericka Lantigua is a 51 year old female with the following diagnoses:   1. Diplopia         Patient was sent for consultation by Dr. Allyson Fournier for diplopia.     HPI:  Patient has a history of cerebellar infarct 3/2019, had awoken with \"couldn't see; dark in both eyes.\" Lasted less than a minute. Thinks she also may have very transient weakness on the LUE. Work up showed possible PFO.     In 10/2021, woke up with intermittent binocular diagonal double vision, which lasted at least an hour. Had an unremarkable MRI and MRA brain and neck w/ and w/o contrast at that time. Thus, was diagnosed with a possible TIA. Not stress related.   Now she has a new symptom.  She is experiencing episodes where everything in the world seems to tilt between 45 and 90 degrees and then returns back to being upright.  This lasts about 15 seconds or so.  It is happening once every couple weeks now. Last episode was over a week ago. Lasts around 1-5 seconds now. Happens more often when looking at distance.     Patient denies any noticeable change in appearance of eyes, periorbita, and lids. No blurry vision, flashes, floaters, redness, or discharge.   No headaches, no jaw claudication, unintentional weight loss, fevers, chills    ROS otherwise negative except for the above.     Independent historians:  Patient    Review of outside testing:  MRI and MRA brain and neck w/ and w/o contrast 10/11/2021  Recent labs 10/11/21    My interpretation performed today of outside testing:  Normal, no acute infarction or inflammation, and no evidence of extraocular enlargement  10/11/21 TSH 4.66, T4 free " 0.94 -- subclinical     Review of outside clinical notes:  Dr. Allyson Fournier clinic notes     Past medical history:  History of cerebellar infarct 3/2019  Possible PFO  Anxiety  No hypertension or diabetes or thyroid problems.     Medications:   aspirin  B12-ACTIVE PO  CO Q 10 PO  IRON PO  KRILL OIL PO  STATIN NOT PRESCRIBED  Vitamin C Chew  VITAMIN D (CHOLECALCIFEROL) PO  VITAMIN K PO  ZINC ACETATE PO    Family history / social history:  No ocular history.   Mother with atrial fib.    Works from home in [place]. On the computer often.     Current smoker, 4-5 cigs a day. Occasional alcohol. No illicit drug.     Exam:  Visual acuity 20/20 right eye 20/20-2 left eye.  Color vision 11/11 right eye and 11/11 left eye. Pupils brisk without relative afferent pupillary defect.  Intraocular pressure 15 right eye and 16 left eye.  Anterior segment exam was unremarkable.  Fundus exam showed healthy appearance of the optic nerves and otherwise unremarkable.  Strabismus exam trace exophoria at distance and trace esophoria at near. Extraocular movements full.     Discussion of management / interpretation with another provider:   None    Assessment/Plan:   It is my impression that she has a history of a cerebellar infarction.  She had an episode of double vision lasting 1 hour in October.  This was likely some type of transient ischemic attack.  She is being evaluated for this.  The episodes of blurring of her vision are unlikely to be transient ischemic attacks.  I am not quite sure what they are but they are too short to be TIAs.  I have asked her to try to cover each eye independently when it occurs.  We discussed that this sensation of blurred vision which lasts seconds at a time could be due to dry eye.  She will try putting artificial tears in her eye to see if this makes it better.    I     Maria Dolores Peralta MD  PGY-3 Resident Physician  Department of Ophthalmology     Attending Physician Attestation:  Complete documentation of  historical and exam elements from today's encounter can be found in the full encounter summary report (not reduplicated in this progress note).  I personally obtained the chief complaint(s) and history of present illness.  I confirmed and edited as necessary the review of systems, past medical/surgical history, family history, social history, and examination findings as documented by others; and I examined the patient myself.  I personally reviewed the relevant tests, images, and reports as documented above.  I formulated and edited as necessary the assessment and plan and discussed the findings and management plan with the patient and family. I personally reviewed the ophthalmic test(s) associated with this encounter, agree with the interpretation(s) as documented by the resident/fellow, and have edited the corresponding report(s) as necessary.  - Armond Smith MD              Again, thank you for allowing me to participate in the care of your patient.      Sincerely,    Armond Smith MD

## 2022-05-04 ENCOUNTER — TELEPHONE (OUTPATIENT)
Dept: FAMILY MEDICINE | Facility: CLINIC | Age: 52
End: 2022-05-04

## 2022-05-04 NOTE — TELEPHONE ENCOUNTER
Patient Quality Outreach    Patient is due for the following:   Breast Cancer Screening - Mammogram  Cervical Cancer Screening - PAP Needed    NEXT STEPS:   Schedule a office visit for pap and mammogram    Type of outreach:    Phone, left message for patient/parent to call back.      Questions for provider review:    None     Laura Parker, [facility]  Chart routed to Care Team.

## 2022-06-04 ENCOUNTER — HEALTH MAINTENANCE LETTER (OUTPATIENT)
Age: 52
End: 2022-06-04

## 2022-09-06 ENCOUNTER — TELEPHONE (OUTPATIENT)
Dept: FAMILY MEDICINE | Facility: CLINIC | Age: 52
End: 2022-09-06

## 2022-09-06 NOTE — TELEPHONE ENCOUNTER
Patient Quality Outreach    Patient is due for the following:   Colon Cancer Screening  Breast Cancer Screening - Mammogram  Cervical Cancer Screening - PAP Needed  Physical Preventive Adult Physical    Next Steps:   Schedule a Adult Preventative    Type of outreach:    Sent [employer] message.      Questions for provider review:    None     Mona Ho, [facility]

## 2022-10-10 ENCOUNTER — HEALTH MAINTENANCE LETTER (OUTPATIENT)
Age: 52
End: 2022-10-10

## 2022-12-08 ENCOUNTER — TELEPHONE (OUTPATIENT)
Dept: FAMILY MEDICINE | Facility: CLINIC | Age: 52
End: 2022-12-08

## 2022-12-08 NOTE — TELEPHONE ENCOUNTER
Patient Quality Outreach    Patient is due for the following:   Breast Cancer Screening - Mammogram  Cervical Cancer Screening - PAP Needed    Next Steps:   Schedule a office visit for PAP and Mammo    Type of outreach:    Sent [employer] message.      Questions for provider review:    None     Sheree White MA

## 2023-02-18 ENCOUNTER — HEALTH MAINTENANCE LETTER (OUTPATIENT)
Age: 53
End: 2023-02-18

## 2023-06-07 NOTE — CONSULTS
"      [facility]    Stroke Consult Note    Reason for Consult: Stroke Code     Chief Complaint: Eye Problem (Reports her vision is off. States her vision is \"tilting\" at times. Similar presentation when she had a stroke a few years ago. Started about 0545.)      JOSE ALBERTO  Ericka Lantigua is a 50 year old female with past medical history of current smoker for the past 35 years (about 7-10 cigarettes daily) on  mg daily, prior cerebellar stroke 3 years ago, elevated blood pressure not on home antihypertensives, and history of anemia.    She was LKW last night when she went to bed at 2200. When she woke up at 0600 and attempted to get out of bed she noticed bilateral blurring/diplopia/\"twisting\" of vision. No associated headache. She attempted to lie back down and symptoms were still present. She confirmed by closing each eye that symptoms were present in both eyes. She denies any continued visual deficits from her prior stroke, although after prior stroke it did take months for the symptoms to fully resolve. Since this is similar symptoms to her prior stroke she immediately notified her  who brought her to the ED for evaluation.    In the ED, symptoms were still present but only intermittently. She was also hypertensive with SBP >200--allowed permissive HTN. CT/CTA negative for acute pathology.    Imaging Findings  NCCT 10/11/21: No evidence of acute intracranial hemorrhage, mass, or herniation.  CTA head/neck 10/11/21 : Patent arteries, No dissection, No aneurysm/significant stenosis, marked emphysematous changes at lung apices  -MRI brain w & wo 10/11/21: No evidence of acute infarct, mass, hemorrhage, or herniation, chronic  microvascular ischemic disease  -MRA brain wo 10/11/21: normal  -MRA neck w & wo 10/11/21: normal    Thrombolytic Treatment   Not given due to minor/isolated/quickly resolving symptoms and unclear or unfavorable risk-benefit profile for extended window " "thrombolysis beyond the conventional 4.5 hour time window.    Endovascular Treatment  Not initiated due to absence of proximal vessel occlusion    Impression   51 yo female with history of prior cerebellar CVA 3 years ago with similar presenting symptoms and acute onset bilateral visual \"twisting\" sensation upon awaking this AM, suspect Benign positional vertigo, cannot rule out TIA.    Recommendations  - okay to discharge with TIA pathway with outpatient follow-up   - Continue daily aspirin 325 mg for secondary stroke prevention  - Plavix (clopidogrel) 300 mg PO loading dose x 1  - Plavix (clopidogrel) 75 mg PO Daily x 3 weeks  - obtain lipid panel, continue home rosuvastatin 20 mg, goal LDL 40-70  - TTE (with Bubble Study if age 60 yrs or less) outpatient  - 24-hour Telemetry, 30 day CardioNet monitoring  - Bedside Glucose Monitoring  - A1c, Lipid Panel  - PT/OT/SLP  - Stroke Education  - Smoking Cessation, nicotine gum  - Euthermia, Euglycemia, eunatremia  - permissive HTN SBP <220 x 24 hrs, long term blood pressure goal <140/90  -consider outpatient referral to PT if recurrent vertigo-type symptoms    Patient Follow-up    - in the next 1-2 week(s) with PCP  - within 72 hours with stroke clinic JOHANA (any stroke JOHANA) at virtual stroke clinic. Patient will be contacted by virtual stroke clinic team after discharge.     Thank you for this consult. No further stroke evaluation is recommended, so we will sign off. Please contact us with any additional questions.    Barbara Epps PA-C  Vascular Neurology  To page me or covering stroke neurology team member, click here: [facility]COM   Choose \"On Call\" tab at top, then search dropdown box for \"Neurology Adult\", select location, press Enter, then look for stroke/neuro ICU/telestroke.    ______________________________________________________    Clinically Significant Risk Factors Present on Admission             # Platelet Defect: home medication list includes an antiplatelet " "medication       Past Medical History   Past Medical History:   Diagnosis Date     Anxiety      Cerebral infarction (H)      Past Surgical History   No past surgical history on file.  Medications   Home Meds  Prior to Admission medications    Medication Sig Start Date End Date Taking? Authorizing Provider   Alum Hydroxide-Mag Carbonate (GAVISCON EXTRA STRENGTH) 508-475 MG/10ML SUSP Take 2-4 teaspoonful by mouth 4 times daily as needed For acid reflux or heart burn symptoms 7/9/20   Gus Tovar MD   aspirin (ASA) 325 MG EC tablet Take 1 tablet (325 mg) by mouth daily 3/2/19   Luis Manuel Norton MD   nicotine (NICODERM CQ) 14 MG/24HR 24 hr patch Place 1 patch onto the skin daily 3/2/19   Luis Manuel Norton MD   rosuvastatin (CRESTOR) 20 MG tablet 1 tablet (20 mg) by Oral or NG Tube route At Bedtime 3/1/19   Luis Manuel Norton MD       Scheduled Meds    aspirin  325 mg Oral Once       Infusion Meds      PRN Meds      Allergies   No Known Allergies  Family History   No family history on file.  Social History   Social History     Tobacco Use     Smoking status: Current Every Day Smoker     Packs/day: 1.00   Substance Use Topics     Alcohol use: Yes     Comment: daily, 1-2 drinks per day     Drug use: No       Review of Systems   The 10 point Review of Systems is negative other than noted in the HPI or here.        PHYSICAL EXAMINATION  Temp:  [98.4  F (36.9  C)-99.4  F (37.4  C)] 99.4  F (37.4  C)  Pulse:  [105-123] 107  Resp:  [18-20] 18  BP: (177-205)/() 177/99  SpO2:  [96 %-99 %] 98 %     Neurologic  Mental Status:  alert, oriented x 3, follows commands, speech clear and fluent, naming and repetition normal  Cranial Nerves:  visual fields intact but still reporting some intermittent diplopia/\"twisting\" of vision--still able to accurately identify how many fingers are being held up in each visual field, PERRL, EOMI with normal smooth pursuit, facial sensation intact and symmetric, facial movements " symmetric, hearing not formally tested but intact to conversation, palate elevation symmetric and uvula midline, no dysarthria, tongue protrusion midline  Motor:  normal muscle tone and bulk, no abnormal movements, able to move all limbs spontaneously, strength 5/5 throughout upper and lower extremities, no pronator drift  Reflexes:  toes down-going  Sensory:  light touch sensation intact and symmetric throughout upper and lower extremities, no extinction on double simultaneous stimulation   Coordination:  normal finger-to-nose and heel-to-shin bilaterally without dysmetria, rapid alternating movements symmetric  Station/Gait:  deferred    Dysphagia Screen  Per Nursing    Stroke Scales    NIHSS  Interval     Interval Comments initial stroke code jenaro (10/11/21 0756)   1a. Level of Consciousness 0-->Alert, keenly responsive   1b. LOC Questions 0-->Answers both questions correctly   1c. LOC Commands 0-->Performs both tasks correctly   2.   Best Gaze 0-->Normal   3.   Visual 0-->No visual loss   4.   Facial Palsy 0-->Normal symmetrical movements   5a. Motor Arm, Left 0-->No drift, limb holds 90 (or 45) degrees for full 10 secs   5b. Motor Arm, Right 0-->No drift, limb holds 90 (or 45) degrees for full 10 secs   6a. Motor Leg, Left 0-->No drift, leg holds 30 degree position for full 5 secs   6b. Motor Leg, right 0-->No drift, leg holds 30 degree position for full 5 secs   7.   Limb Ataxia 0-->Absent   8.   Sensory 0-->Normal, no sensory loss   9.   Best Language 0-->No aphasia, normal   10. Dysarthria 0-->Normal   11. Extinction and Inattention  0-->No abnormality   Total 0 (10/11/21 0756)       Modified [place] Score (Pre-morbid)  0-No deficits    Imaging  I personally reviewed all imaging; relevant findings per HPI.     Lab Results Data   CBC  Recent Labs   Lab 10/11/21  0702   WBC 9.5   RBC 4.85   HGB 14.6   HCT 44.9        Basic Metabolic Panel    Recent Labs   Lab 10/11/21  0702 10/11/21  0659     --     POTASSIUM 3.8  --    CHLORIDE 107  --    CO2 24  --    BUN 14  --    CR 0.86  --    * 164*   LILLY 8.9  --      Liver Panel  No results for input(s): PROTTOTAL, ALBUMIN, BILITOTAL, ALKPHOS, AST, ALT, BILIDIRECT in the last 168 hours.  INR    Recent Labs   Lab Test 10/11/21  0702 02/28/19  0930   INR 0.92 1.00      Lipid Profile    Recent Labs   Lab Test 02/28/19  1847   CHOL 187   HDL 42*   *   TRIG 152*     A1C    Recent Labs   Lab Test 02/28/19  1847   A1C 5.1     Troponin I    Recent Labs   Lab 10/11/21  0702   TROPONIN <0.015          Stroke Code Data Data   Stroke Code Data  (for stroke code without tele)  Stroke code activated 10/11/21   0659   First stroke provider response 10/11/21   0706   Last known normal 10/10/21   2200 (Last night before going to bed)   Time of discovery   (or onset of symptoms) 10/11/21   0445   Head CT read by Stroke Neuro Dr/Provider 10/11/21   0710   Was stroke code de-escalated? Yes 10/11/21 0735  presence of contraindications for both intravenous and intra-arterial stroke treatments          details…

## 2023-06-10 ENCOUNTER — HEALTH MAINTENANCE LETTER (OUTPATIENT)
Age: 53
End: 2023-06-10

## 2024-03-16 ENCOUNTER — HEALTH MAINTENANCE LETTER (OUTPATIENT)
Age: 54
End: 2024-03-16

## 2024-12-31 NOTE — CONSULTS
[facility]    Vascular Neurology / Stroke Consultation Note     Ericka Lantigua MRN# [id number]   YOB: 1970 Age: 48 year old    Code Status:Full Code   Date of Admission: 2/28/2019  Date of Consult: 02/28/2019    _________________________________   Primary Care Physician   Physician No Ref-Primary      ______________________________________________         Assessment & Plan     Reason for consult: I was asked by Dr. Norton to evaluate this patient for stroke        ______________________________________________  #. (I63.9) Cerebrovascular accident (CVA), unspecified mechanism (H)  --very small left cerebellar infarct on MRI  --MRA head/neck unremarkable  --echo pending  --, not on statin PTA  -----started statin  --not on aspirin PTA  -----started full dose aspirin  --normal A1c 5.1  --smoking cessation education provided  #. (H53.8) Blurred vision  --intermittent episodes of blurred vision for 6 months, occur every 2-4 weeks  --does not correlate with location of stroke  -----recommend follow up with ophthalmology  -----may be related to extended contact use vs ocular migraine  #. DVT Prophylaxis  --per primary service  #. PT/OT/Speech  --Start evaluations  #. Nutrition / GI Prophylaxis  --Per recommendations of speech therapy      #. Code Status: Full Code  Ok to discharge today. Follow up with outpatient neurology in 4-6 weeks. Agree with recommendation to take the next week off work. Recommend ophthalmology evaluation as outpatient.       Chief Complaint   ______________________________________________  Blurry vision  History is obtained from the patient's family    History of Present Illness   ______________________________________________  Ericka Lantigua is a 48 year old female who presented with blurry vision. Reported random episodes of blurry vision that last approximately 30-60 seconds for the past 2-3 months. Morning of admission had an episode of blurry  vision with co-occurrence of left arm weakness, which led her to come to the ED. She denied headache or other associated symptoms. MRI brain was obtained which identified small left cerebellar infarct. MRA head/neck negative.     On exam, no focal neurological deficits noted. No current blurred vision. Patient notes the blurred vision episodes have actually been happening for about 6 months, about every 2-4 weeks, lasting seconds. The episode the day of admission was present upon waking and was more severe than previous episodes, and lasted much longer. She did not identify left arm weakness until she was on the phone with the nurse helpline, and thought maybe her left arm was mildly weak when asked. She has no further weakness today. Patient admits to wearing her monthly contacts longer than necessary, but feels she typically has a warning that her vision is going to go blurry before it does. She denies headaches or any other associated symptoms. She sees an eye doctor regularly for her contact, but not an ophthalmologist. She takes no medications or supplements on a regular basis. She has had increased stress lately at work. She notes that her father passed away from a brain aneurysm.   Past Medical History    ______________________________________________  Past Medical History:   Diagnosis Date     Anxiety      Past Surgical History   ______________________________________________  History reviewed. No pertinent surgical history.  Prior to Admission Medications   ______________________________________________  None     Allergies   No Known Allergies    Social History   ______________________________________________  Social History     Socioeconomic History     Marital status:      Spouse name: None     Number of children: None     Years of education: None     Highest education level: None   Occupational History     None   Social Needs     Financial resource strain: None     Food insecurity:     Worry: None      Inability: None     Transportation needs:     Medical: None     Non-medical: None   Tobacco Use     Smoking status: Current Every Day Smoker     Packs/day: 1.00   Substance and Sexual Activity     Alcohol use: Yes     Comment: daily, 1-2 drinks per day     Drug use: No     Sexual activity: None   Lifestyle     Physical activity:     Days per week: None     Minutes per session: None     Stress: None   Relationships     Social connections:     Talks on phone: None     Gets together: None     Attends Mosque service: None     Active member of club or organization: None     Attends meetings of clubs or organizations: None     Relationship status: None     Intimate partner violence:     Fear of current or ex partner: None     Emotionally abused: None     Physically abused: None     Forced sexual activity: None   Other Topics Concern     None   Social History Narrative     None       Family History   ______________________________________________  Reviewed and not felt to be contributory.     Review of Systems   ______________________________________________  A comprehensive review of  10 systems was performed and found to be negative except as described in this note  CONSTITUTIONAL: negative for fever, chills, change in weight  INTEGUMENTARY/SKIN: no rash or obvious new lesions  ENT/MOUTH: no sore throat, new sinus pain or nasal drainage, no neck mass noted  RESP: No pleuretic pain, No cough, no hemoptysis, No SOB   CV: negative for chest pain, palpitations or peripheral edema  GI: no nausea, vomiting, change in stools  : no dysuria or hematuria  MUSCULOSKELETAL: no myalgias, arthralgias or join efffusion  ENDOCRINE: no history of polyuria, polydyspsia or symptoms of thyroid dysfunction  PSYCHIATRIC: no change in mood stable  LYMPHATIC: no new lymphadenopathy  HEME: no bleeding or easy bruisability  NEURO: see HPI    Physical Exam   ______________________________________________  Weight:174 lbs 0 oz; Height:5'  "8\"  Temp: 98.6  F (37  C) Temp src: Oral BP: 142/90 Pulse: 91   Resp: 16 SpO2: 99 % O2 Device: None (Room air)    General Appearance:  No acute distress  Neuro:       Mental Status Exam:   Awake, alert, oriented X3. Speech and language are intact. Mental status is normal       Cranial Nerves:  Pupils 3 mm, reactive. EOMI. Face sensation is normal. Face is symmetric. Tongue and uvula are midline. Other CN are normal           Motor:  5/5 X 4. Tone and bulk are normal           Reflexes:  Normal DTR. Toes downgoing.        Sensory:  Normal to light touch               Coordination:   Intact finger-to-nose        Gait:  No significant difficulties  Neck: no nuchal rigidity, normal thyroid. No carotid bruits.    Cardiovascular: Regular rate and rhythm, no m/r/g  Lungs: Clear to auscultation  Abdomen: Soft, not tender, not distended  Extremities: No clubbing, no cyanosis, no edema    Data   ______________________________________________  All Data personally reviewed:       Labs:   CBC RESULTS:     Recent Labs   Lab 02/28/19 0930   WBC 7.4   RBC 4.85   HGB 8.3*   HCT 31.0*        Basic Metabolic Panel:   Recent Labs   Lab Test 02/28/19 0930 05/31/18 2200    139   POTASSIUM 3.9 3.3*   CHLORIDE 104 107   CO2 25 24   BUN 9 7   CR 0.71 0.76   * 141*   LILLY 8.6 8.3*     Liver panel:  Recent Labs   Lab Test 05/31/18 2200   PROTTOTAL 7.0   ALBUMIN 3.5   BILITOTAL 0.2   ALKPHOS 102   AST 11   ALT 15     INR:  Recent Labs   Lab Test 02/28/19 0930   INR 1.00      Troponin I:   Recent Labs   Lab Test 05/31/18 2200   TROPI <0.015     UA Results:  Recent Labs   Lab Test 05/31/18 2228   COLOR Straw   APPEARANCE Clear   URINEGLC Negative   URINEBILI Negative   URINEKETONE Negative   SG 1.002*   UBLD Negative   URINEPH 6.5   PROTEIN Negative   NITRITE Negative   LEUKEST Negative   RBCU <1   WBCU <1          Cardiac US:   --       Neurophysiology:   --       Imaging:   All imaging studies were reviewed " personally  MRI brain 2/28/19:   Small, 6 mm area of restricted diffusion in the left cerebellum. This is consistent with a recent cerebellar infarct. No bleed or mass effect.    MRA head 2/28/19:  Normal MR angiogram of the head.    MRA neck 2/28/19:  1. No significant stenosis is seen at either carotid bifurcation.  2. No arterial dissection is identified.      Text Page    Vicenta Aguirre, MSN, FNP-BC, RN CNRN SCRN     Procedure To Be Performed At Next Visit: Biopsy by punch method Size Of Lesion In Cm (Optional): 0 Detail Level: Detailed Introduction Text (Please End With A Colon): The following is to be performed next visit: Instructions (Optional): A. Left proximal posterior upper arm Size: 0.6cm R/O: Dermatofibroma vs LENTIGO maligna vs NUB